# Patient Record
Sex: MALE | Race: WHITE | Employment: FULL TIME | URBAN - METROPOLITAN AREA
[De-identification: names, ages, dates, MRNs, and addresses within clinical notes are randomized per-mention and may not be internally consistent; named-entity substitution may affect disease eponyms.]

---

## 2018-12-14 ENCOUNTER — HOSPITAL ENCOUNTER (OUTPATIENT)
Age: 48
Discharge: HOME | End: 2018-12-16

## 2018-12-14 PROCEDURE — 87081 CULTURE SCREEN ONLY: CPT

## 2018-12-14 PROCEDURE — 88305 TISSUE EXAM BY PATHOLOGIST: CPT

## 2018-12-15 LAB — CLOTEST: NORMAL

## 2021-03-09 ENCOUNTER — IMMUNIZATION (OUTPATIENT)
Dept: PRIMARY CARE CLINIC | Age: 51
End: 2021-03-09
Payer: COMMERCIAL

## 2021-03-09 PROCEDURE — 91303 COVID-19, J&J VACCINE, PF, 0.5 ML DOSE: CPT | Performed by: NURSE PRACTITIONER

## 2021-03-09 PROCEDURE — 0031A COVID-19, J&J VACCINE, PF, 0.5 ML DOSE: CPT | Performed by: NURSE PRACTITIONER

## 2022-04-07 ENCOUNTER — HOSPITAL ENCOUNTER (OUTPATIENT)
Age: 52
Discharge: HOME | End: 2022-04-09
Payer: COMMERCIAL

## 2022-04-07 ENCOUNTER — HOSPITAL ENCOUNTER (OUTPATIENT)
Dept: GENERAL RADIOLOGY | Age: 52
Discharge: HOME | End: 2022-04-09
Payer: COMMERCIAL

## 2022-04-07 DIAGNOSIS — R10.30 LOWER ABDOMINAL PAIN: ICD-10-CM

## 2022-04-07 PROCEDURE — 74018 RADEX ABDOMEN 1 VIEW: CPT

## 2024-02-01 ENCOUNTER — HOSPITAL ENCOUNTER (OUTPATIENT)
Age: 54
Discharge: HOME | End: 2024-02-03

## 2024-02-05 LAB — SURGICAL PATHOLOGY REPORT: NORMAL

## 2024-04-12 ENCOUNTER — HOSPITAL ENCOUNTER (OUTPATIENT)
Dept: NUCLEAR MEDICINE | Age: 54
Discharge: HOME | End: 2024-04-12
Attending: SURGERY
Payer: COMMERCIAL

## 2024-04-12 VITALS — BODY MASS INDEX: 27.98 KG/M2 | WEIGHT: 195 LBS

## 2024-04-12 DIAGNOSIS — R11.0 NAUSEA: ICD-10-CM

## 2024-04-12 DIAGNOSIS — R10.9 ABDOMINAL PAIN, UNSPECIFIED ABDOMINAL LOCATION: ICD-10-CM

## 2024-04-12 PROCEDURE — 6360000002 HC RX W HCPCS: Performed by: RADIOLOGY

## 2024-04-12 PROCEDURE — 2580000003 HC RX 258: Performed by: RADIOLOGY

## 2024-04-12 PROCEDURE — A9537 TC99M MEBROFENIN: HCPCS | Performed by: RADIOLOGY

## 2024-04-12 PROCEDURE — 78227 HEPATOBIL SYST IMAGE W/DRUG: CPT

## 2024-04-12 PROCEDURE — 3430000000 HC RX DIAGNOSTIC RADIOPHARMACEUTICAL: Performed by: RADIOLOGY

## 2024-04-12 RX ADMIN — SINCALIDE 1.77 MCG: 5 INJECTION, POWDER, LYOPHILIZED, FOR SOLUTION INTRAVENOUS at 08:47

## 2024-04-12 RX ADMIN — Medication 6 MILLICURIE: at 07:40

## 2024-04-15 ENCOUNTER — HOSPITAL ENCOUNTER (OUTPATIENT)
Age: 54
Discharge: HOME | End: 2024-04-15

## 2024-04-17 ENCOUNTER — HOSPITAL ENCOUNTER (OUTPATIENT)
Age: 54
Discharge: HOME | End: 2024-04-17
Payer: COMMERCIAL

## 2024-04-17 LAB
ALBUMIN SERPL-MCNC: 4.8 G/DL (ref 3.5–5.2)
ALP SERPL-CCNC: 30 U/L (ref 40–129)
ALT SERPL-CCNC: 44 U/L (ref 0–40)
AMYLASE SERPL-CCNC: 56 U/L (ref 20–100)
ANION GAP SERPL CALCULATED.3IONS-SCNC: 13 MMOL/L (ref 7–16)
AST SERPL-CCNC: 31 U/L (ref 0–39)
BASOPHILS # BLD: 0.05 K/UL (ref 0–0.2)
BASOPHILS NFR BLD: 1 % (ref 0–2)
BILIRUB SERPL-MCNC: 0.3 MG/DL (ref 0–1.2)
BUN SERPL-MCNC: 31 MG/DL (ref 6–20)
C DIFF GDH + TOXINS A+B STL QL IA.RAPID: NEGATIVE
CALCIUM SERPL-MCNC: 10 MG/DL (ref 8.6–10.2)
CHLORIDE SERPL-SCNC: 100 MMOL/L (ref 98–107)
CHOLEST SERPL-MCNC: 159 MG/DL
CO2 SERPL-SCNC: 23 MMOL/L (ref 22–29)
CREAT SERPL-MCNC: 1.3 MG/DL (ref 0.7–1.2)
EOSINOPHIL # BLD: 0.15 K/UL (ref 0.05–0.5)
EOSINOPHILS RELATIVE PERCENT: 2 % (ref 0–6)
ERYTHROCYTE [DISTWIDTH] IN BLOOD BY AUTOMATED COUNT: 12.1 % (ref 11.5–15)
GFR SERPL CREATININE-BSD FRML MDRD: 68 ML/MIN/1.73M2
GLUCOSE SERPL-MCNC: 84 MG/DL (ref 74–99)
HBA1C MFR BLD: 7.6 % (ref 4–5.6)
HCT VFR BLD AUTO: 40.9 % (ref 37–54)
HDLC SERPL-MCNC: 30 MG/DL
HGB BLD-MCNC: 13.8 G/DL (ref 12.5–16.5)
IMM GRANULOCYTES # BLD AUTO: <0.03 K/UL (ref 0–0.58)
IMM GRANULOCYTES NFR BLD: 0 % (ref 0–5)
LDLC SERPL CALC-MCNC: 76 MG/DL
LIPASE SERPL-CCNC: 43 U/L (ref 13–60)
LYMPHOCYTES NFR BLD: 2.26 K/UL (ref 1.5–4)
LYMPHOCYTES RELATIVE PERCENT: 29 % (ref 20–42)
MCH RBC QN AUTO: 29.4 PG (ref 26–35)
MCHC RBC AUTO-ENTMCNC: 33.7 G/DL (ref 32–34.5)
MCV RBC AUTO: 87.2 FL (ref 80–99.9)
MONOCYTES NFR BLD: 0.43 K/UL (ref 0.1–0.95)
MONOCYTES NFR BLD: 5 % (ref 2–12)
NEUTROPHILS NFR BLD: 63 % (ref 43–80)
NEUTS SEG NFR BLD: 5.01 K/UL (ref 1.8–7.3)
PLATELET # BLD AUTO: 241 K/UL (ref 130–450)
PMV BLD AUTO: 9.3 FL (ref 7–12)
POTASSIUM SERPL-SCNC: 4.4 MMOL/L (ref 3.5–5)
PROT SERPL-MCNC: 7.4 G/DL (ref 6.4–8.3)
RBC # BLD AUTO: 4.69 M/UL (ref 3.8–5.8)
SODIUM SERPL-SCNC: 136 MMOL/L (ref 132–146)
SPECIMEN DESCRIPTION: NORMAL
TRIGL SERPL-MCNC: 266 MG/DL
VLDLC SERPL CALC-MCNC: 53 MG/DL
WBC OTHER # BLD: 7.9 K/UL (ref 4.5–11.5)

## 2024-04-17 PROCEDURE — 85025 COMPLETE CBC W/AUTO DIFF WBC: CPT

## 2024-04-17 PROCEDURE — 80061 LIPID PANEL: CPT

## 2024-04-17 PROCEDURE — 87324 CLOSTRIDIUM AG IA: CPT

## 2024-04-17 PROCEDURE — 87427 SHIGA-LIKE TOXIN AG IA: CPT

## 2024-04-17 PROCEDURE — 80053 COMPREHEN METABOLIC PANEL: CPT

## 2024-04-17 PROCEDURE — 87449 NOS EACH ORGANISM AG IA: CPT

## 2024-04-17 PROCEDURE — 87045 FECES CULTURE AEROBIC BACT: CPT

## 2024-04-17 PROCEDURE — 82150 ASSAY OF AMYLASE: CPT

## 2024-04-17 PROCEDURE — 83036 HEMOGLOBIN GLYCOSYLATED A1C: CPT

## 2024-04-17 PROCEDURE — 87046 STOOL CULTR AEROBIC BACT EA: CPT

## 2024-04-17 PROCEDURE — 36415 COLL VENOUS BLD VENIPUNCTURE: CPT

## 2024-04-17 PROCEDURE — 83690 ASSAY OF LIPASE: CPT

## 2024-04-19 LAB
MICROORGANISM SPEC CULT: NORMAL
MICROORGANISM SPEC CULT: NORMAL
SPECIMEN DESCRIPTION: NORMAL

## 2024-05-21 ENCOUNTER — OFFICE VISIT (OUTPATIENT)
Dept: SURGERY | Facility: CLINIC | Age: 54
End: 2024-05-21
Payer: COMMERCIAL

## 2024-05-21 VITALS
DIASTOLIC BLOOD PRESSURE: 73 MMHG | SYSTOLIC BLOOD PRESSURE: 116 MMHG | OXYGEN SATURATION: 98 % | WEIGHT: 190.4 LBS | HEART RATE: 87 BPM

## 2024-05-21 DIAGNOSIS — K82.8 BILIARY DYSKINESIA: Primary | ICD-10-CM

## 2024-05-21 PROCEDURE — 99203 OFFICE O/P NEW LOW 30 MIN: CPT | Performed by: SURGERY

## 2024-05-21 RX ORDER — ENOXAPARIN SODIUM 300 MG/3ML
30 INJECTION INTRAVENOUS; SUBCUTANEOUS ONCE
OUTPATIENT
Start: 2024-05-21 | End: 2024-05-21

## 2024-05-21 RX ORDER — INDOCYANINE GREEN AND WATER 25 MG
2.5 KIT INJECTION ONCE
OUTPATIENT
Start: 2024-05-21 | End: 2024-05-21

## 2024-05-21 RX ORDER — CEFAZOLIN SODIUM 2 G/100ML
2 INJECTION, SOLUTION INTRAVENOUS ONCE
OUTPATIENT
Start: 2024-05-21 | End: 2024-05-21

## 2024-05-21 NOTE — PROGRESS NOTES
General Surgery History and Physical    Referring Provider:  MD Dimas Leigh, Celestine Sauceda MD     Chief Complaint:  Chief Complaint   Patient presents with    New Patient Visit     Eval gallbladder       History of Present Illness:  Adrian Vides is a 53 y.o. male who presents with chronic RUQ pain.   Has had RUQ pain on and off for several months.   With associated nausea   Pain worse when eating, especially grease food per patient   He also has diarrhea which is helped with imodium. Per patient, his doctor adjusted his medications including DM meds which seemed to help to some degree.   However, his pain persists.   Had HIDA scan on 4/12 which showed GB EF at 19%, concerning for biliary dyskinesia.   Recently had US which was concerning for fatty liver, but otherwise not remarkable.   He came today to discuss treatment of biliary dyskinesia.     Had colonoscopy about one year ago: no remarkable per patient.     Past Medical History:  No past medical history on file.     Past Surgical History:  No past surgical history on file.     Medications:  No current outpatient medications     Allergies:  Not on File     Family History:  No family history on file.     Social History:  Social History     Socioeconomic History    Marital status:      Spouse name: Not on file    Number of children: Not on file    Years of education: Not on file    Highest education level: Not on file   Occupational History    Not on file   Tobacco Use    Smoking status: Not on file    Smokeless tobacco: Not on file   Substance and Sexual Activity    Alcohol use: Not on file    Drug use: Not on file    Sexual activity: Not on file   Other Topics Concern    Not on file   Social History Narrative    Not on file     Social Determinants of Health     Financial Resource Strain: Not on file   Food Insecurity: Not on file   Transportation Needs: Not on file   Physical Activity: Not on file   Stress: Not on file   Social  "Connections: Not on file   Intimate Partner Violence: Not on file   Housing Stability: Not on file        Review of Systems:  A complete 12 point review of systems was performed. Please see scanned questionnaire and is otherwise negative except as noted in the history of present illness.    Vital Signs:  Vitals:    05/21/24 0923   BP: 116/73   Pulse: 87   SpO2: 98%      There is no height or weight on file to calculate BMI.      Physical Exam:  General: No acute distress.   Neuro: Alert and oriented ×3. Follows commands.  Face: Atraumatic, no visible skin lesion.   Head: Atraumatic  Eyes: Pupils equal reactive to light. Extraocular motions intact.  Ears: Hears normal speaking voice.  Mouth, Nose, Throat: Mucous membranes moist.  Normal dentition.  Neck: Supple. No visible masses.  Breast: Not examined.   Lung: Patent airway and no labored breath.   Vascular: Palpable radial pulses bilaterally.  Abdomen: Soft, NT,ND.   Rectal and Perianal: Not examined.   Genitourinary: Not examined.   Musculoskeletal: Moves all extremities.  Normal range of motion.  Extremities: No cyanosis. No edema.   Lymphatic: No palpable lymph nodes.  Skin: No rashes or lesions.  Psychological: Normal affect      Laboratory Values:  CBC: No results found for: \"WBC\", \"RBC\", \"HGB\", \"HCT\", \"PLT\"    RFP: No results found for: \"GLUF\", \"NA\", \"K\", \"CL\", \"CO2\", \"BUN\", \"CREATININE\", \"CALCIUM\", \"MG\", \"PHOS\"     LFTs: No results found for: \"PROT\", \"ALBUMIN\", \"BILITOT\", \"BILIDIR\", \"ALKPHOS\", \"AST\", \"ALT\"     Labs from 4/17 was reviewed: OK.       Imaging:  I have personally reviewed the images and the radiologist's report.  HIDA and US report was reviewed as well.   My office will upload US disc to our system       Assessment:  This is a 53 y.o. male who presents with   Chronic RUQ pain: consistent with biliary colic   Concerning for biliary dyskinesia       Plan:  I discussed with the patient and explained to the patient that his symptoms are most likely " caused by biliary dyskinesia. I would recommend gallbladder removal. I explained to the patient about the risks and benefits of gallbladder surgery (laparoscopic cholecystectomy or robot assisted laparoscopic cholecystectomy if robot is available on surgery day). Patient expressed understanding that the risks of surgery include but are not limited to bleeding, infection, possible intra-abdominal viscus injury and bile duct injury which may require more extensive surgery, possibility for open surgery, risks of anesthesia, mortality in rare/extreme situations. Patient agreed to proceed. He also understood that he may continue to have pain after surgery. However, I explained to him that most likely surgery will help him.     Due to his comorbidities, he will need PAT before surgery.       Fatmata Block MD [facility]  General Surgery  Office: [phone]  Fax:     [phone]  1:09 PM   05/21/24

## 2024-06-12 ENCOUNTER — PRE-ADMISSION TESTING (OUTPATIENT)
Dept: PREADMISSION TESTING | Facility: HOSPITAL | Age: 54
End: 2024-06-12
Payer: COMMERCIAL

## 2024-06-12 VITALS
BODY MASS INDEX: 28.03 KG/M2 | DIASTOLIC BLOOD PRESSURE: 87 MMHG | HEART RATE: 84 BPM | TEMPERATURE: 98.2 F | HEIGHT: 70 IN | OXYGEN SATURATION: 99 % | WEIGHT: 195.77 LBS | SYSTOLIC BLOOD PRESSURE: 157 MMHG | RESPIRATION RATE: 18 BRPM

## 2024-06-12 DIAGNOSIS — Z01.818 PREOPERATIVE EXAMINATION: Primary | ICD-10-CM

## 2024-06-12 LAB
ALBUMIN SERPL BCP-MCNC: 4.8 G/DL (ref 3.4–5)
ALP SERPL-CCNC: 27 U/L (ref 33–120)
ALT SERPL W P-5'-P-CCNC: 45 U/L (ref 10–52)
ANION GAP SERPL CALC-SCNC: 13 MMOL/L (ref 10–20)
AST SERPL W P-5'-P-CCNC: 29 U/L (ref 9–39)
ATRIAL RATE: 83 BPM
BASOPHILS # BLD AUTO: 0.06 X10*3/UL (ref 0–0.1)
BASOPHILS NFR BLD AUTO: 1 %
BILIRUB SERPL-MCNC: 0.4 MG/DL (ref 0–1.2)
BUN SERPL-MCNC: 20 MG/DL (ref 6–23)
CALCIUM SERPL-MCNC: 10 MG/DL (ref 8.6–10.3)
CHLORIDE SERPL-SCNC: 102 MMOL/L (ref 98–107)
CO2 SERPL-SCNC: 26 MMOL/L (ref 21–32)
CREAT SERPL-MCNC: 0.97 MG/DL (ref 0.5–1.3)
EGFRCR SERPLBLD CKD-EPI 2021: >90 ML/MIN/1.73M*2
EOSINOPHIL # BLD AUTO: 0.15 X10*3/UL (ref 0–0.7)
EOSINOPHIL NFR BLD AUTO: 2.5 %
ERYTHROCYTE [DISTWIDTH] IN BLOOD BY AUTOMATED COUNT: 12.4 % (ref 11.5–14.5)
GLUCOSE SERPL-MCNC: 144 MG/DL (ref 74–99)
HCT VFR BLD AUTO: 38 % (ref 41–52)
HGB BLD-MCNC: 12.9 G/DL (ref 13.5–17.5)
IMM GRANULOCYTES # BLD AUTO: 0.01 X10*3/UL (ref 0–0.7)
IMM GRANULOCYTES NFR BLD AUTO: 0.2 % (ref 0–0.9)
LYMPHOCYTES # BLD AUTO: 1.83 X10*3/UL (ref 1.2–4.8)
LYMPHOCYTES NFR BLD AUTO: 30.2 %
MCH RBC QN AUTO: 29.1 PG (ref 26–34)
MCHC RBC AUTO-ENTMCNC: 33.9 G/DL (ref 32–36)
MCV RBC AUTO: 86 FL (ref 80–100)
MONOCYTES # BLD AUTO: 0.45 X10*3/UL (ref 0.1–1)
MONOCYTES NFR BLD AUTO: 7.4 %
NEUTROPHILS # BLD AUTO: 3.55 X10*3/UL (ref 1.2–7.7)
NEUTROPHILS NFR BLD AUTO: 58.7 %
NRBC BLD-RTO: 0 /100 WBCS (ref 0–0)
P OFFSET: 177 MS
P ONSET: 139 MS
PLATELET # BLD AUTO: 222 X10*3/UL (ref 150–450)
POTASSIUM SERPL-SCNC: 4.4 MMOL/L (ref 3.5–5.3)
PR INTERVAL: 170 MS
PROT SERPL-MCNC: 6.7 G/DL (ref 6.4–8.2)
Q ONSET: 224 MS
QRS COUNT: 13 BEATS
QRS DURATION: 88 MS
QT INTERVAL: 346 MS
QTC CALCULATION(BAZETT): 406 MS
QTC FREDERICIA: 385 MS
R AXIS: -9 DEGREES
RBC # BLD AUTO: 4.44 X10*6/UL (ref 4.5–5.9)
SODIUM SERPL-SCNC: 137 MMOL/L (ref 136–145)
T AXIS: 41 DEGREES
T OFFSET: 397 MS
VENTRICULAR RATE: 83 BPM
WBC # BLD AUTO: 6.1 X10*3/UL (ref 4.4–11.3)

## 2024-06-12 PROCEDURE — 84075 ASSAY ALKALINE PHOSPHATASE: CPT

## 2024-06-12 PROCEDURE — 85025 COMPLETE CBC W/AUTO DIFF WBC: CPT

## 2024-06-12 PROCEDURE — 93005 ELECTROCARDIOGRAM TRACING: CPT

## 2024-06-12 PROCEDURE — 36415 COLL VENOUS BLD VENIPUNCTURE: CPT

## 2024-06-12 PROCEDURE — 99203 OFFICE O/P NEW LOW 30 MIN: CPT | Performed by: NURSE PRACTITIONER

## 2024-06-12 RX ORDER — ATORVASTATIN CALCIUM 40 MG/1
40 TABLET, FILM COATED ORAL EVERY EVENING
COMMUNITY

## 2024-06-12 RX ORDER — SEMAGLUTIDE 2.68 MG/ML
2 INJECTION, SOLUTION SUBCUTANEOUS
COMMUNITY

## 2024-06-12 RX ORDER — ICOSAPENT ETHYL 1 G/1
2 CAPSULE ORAL
COMMUNITY

## 2024-06-12 RX ORDER — AMITRIPTYLINE HYDROCHLORIDE 25 MG/1
25 TABLET, FILM COATED ORAL NIGHTLY
COMMUNITY

## 2024-06-12 RX ORDER — FENOFIBRATE 145 MG/1
145 TABLET, FILM COATED ORAL EVERY EVENING
COMMUNITY

## 2024-06-12 RX ORDER — LISINOPRIL 5 MG/1
5 TABLET ORAL NIGHTLY
COMMUNITY

## 2024-06-12 RX ORDER — OMEPRAZOLE 20 MG/1
20 CAPSULE, DELAYED RELEASE ORAL
COMMUNITY

## 2024-06-12 RX ORDER — METFORMIN HYDROCHLORIDE 500 MG/1
500 TABLET ORAL
COMMUNITY

## 2024-06-12 RX ORDER — CHOLESTYRAMINE 4 G/9G
POWDER, FOR SUSPENSION ORAL
COMMUNITY

## 2024-06-12 ASSESSMENT — DUKE ACTIVITY SCORE INDEX (DASI)
CAN YOU WALK A BLOCK OR TWO ON LEVEL GROUND: YES
CAN YOU DO YARD WORK LIKE RAKING LEAVES, WEEDING OR PUSHING A MOWER: YES
CAN YOU CLIMB A FLIGHT OF STAIRS OR WALK UP A HILL: YES
CAN YOU PARTICIPATE IN MODERATE RECREATIONAL ACTIVITIES LIKE GOLF, BOWLING, DANCING, DOUBLES TENNIS OR THROWING A BASEBALL OR FOOTBALL: YES
CAN YOU TAKE CARE OF YOURSELF (EAT, DRESS, BATHE, OR USE TOILET): YES
CAN YOU HAVE SEXUAL RELATIONS: YES
CAN YOU PARTICIPATE IN STRENOUS SPORTS LIKE SWIMMING, SINGLES TENNIS, FOOTBALL, BASKETBALL, OR SKIING: YES
DASI METS SCORE: 9.9
TOTAL_SCORE: 58.2
CAN YOU DO LIGHT WORK AROUND THE HOUSE LIKE DUSTING OR WASHING DISHES: YES
CAN YOU WALK INDOORS, SUCH AS AROUND YOUR HOUSE: YES
CAN YOU RUN A SHORT DISTANCE: YES
CAN YOU DO HEAVY WORK AROUND THE HOUSE LIKE SCRUBBING FLOORS OR LIFTING AND MOVING HEAVY FURNITURE: YES
CAN YOU DO MODERATE WORK AROUND THE HOUSE LIKE VACUUMING, SWEEPING FLOORS OR CARRYING GROCERIES: YES

## 2024-06-12 ASSESSMENT — PAIN - FUNCTIONAL ASSESSMENT: PAIN_FUNCTIONAL_ASSESSMENT: 0-10

## 2024-06-12 ASSESSMENT — ENCOUNTER SYMPTOMS
DIARRHEA: 1
ROS GI COMMENTS: STOMACH PAIN AFTER EATING
NEUROLOGICAL NEGATIVE: 1
NECK NEGATIVE: 1
CONSTITUTIONAL NEGATIVE: 1
RESPIRATORY NEGATIVE: 1
CARDIOVASCULAR NEGATIVE: 1
MUSCULOSKELETAL NEGATIVE: 1

## 2024-06-12 ASSESSMENT — LIFESTYLE VARIABLES: SMOKING_STATUS: NONSMOKER

## 2024-06-12 ASSESSMENT — PAIN SCALES - GENERAL: PAINLEVEL_OUTOF10: 0 - NO PAIN

## 2024-06-12 NOTE — H&P (VIEW-ONLY)
CPM/PAT Evaluation       Name: Adrian Vides (Adrian Vides)  /Age: [birth year]//53 y.o.     Visit Type:   In-Person       Chief Complaint: chronic RUQ pan     HPI 54 y/o male scheduled for laparoscopic cholecystectomy  on 2024 with  Dr. Block secondary to biliary colic  PMHX includes chronic RUQ pain,  DM2, HLD, prostatitis, urethral stricture.  PAT is consulted today for perioperative risk stratification and optimization.      Past Medical History:   Diagnosis Date    Dyskinesia of gallbladder     Fatty liver     GERD (gastroesophageal reflux disease)     Hyperlipidemia     Hypertension     Type 2 diabetes mellitus (Multi)        Past Surgical History:   Procedure Laterality Date    APPENDECTOMY      URETHRA SURGERY      urethral reconstruction       Patient  has no history on file for sexual activity.    No family history on file.    Allergies   Allergen Reactions    Sulfa (Sulfonamide Antibiotics) Rash    Toradol [Ketorolac] Rash    Tramadol Rash       Prior to Admission medications    Not on File        PAT ROS:   Constitutional:   neg    Neuro/Psych:   neg    Eyes:    use of corrective lenses  Ears:   neg    Nose:   Mouth:   neg    Throat:   neg    Neck:   neg    Cardio:   neg    Respiratory:   neg    Endocrine:   GI:    Stomach pain after eating   diarrhea  :   neg    Musculoskeletal:   neg    Hematologic:   neg    Skin:      Physical Exam  Vitals reviewed.   Constitutional:       Appearance: Normal appearance.   HENT:      Mouth/Throat:      Mouth: Mucous membranes are moist.      Pharynx: Oropharynx is clear.   Eyes:      Pupils: Pupils are equal, round, and reactive to light.   Cardiovascular:      Rate and Rhythm: Normal rate and regular rhythm.      Pulses: Normal pulses.      Heart sounds: Normal heart sounds.   Pulmonary:      Effort: Pulmonary effort is normal.      Breath sounds: Normal breath sounds.   Abdominal:      General: Bowel sounds are normal.      Palpations: Abdomen is soft.    Musculoskeletal:         General: Normal range of motion.      Cervical back: Normal range of motion and neck supple.   Skin:     General: Skin is warm and dry.   Neurological:      General: No focal deficit present.      Mental Status: He is alert and oriented to person, place, and time.   Psychiatric:         Mood and Affect: Mood normal.         Behavior: Behavior normal.          PAT AIRWAY:   Airway:     Mallampati::  IV    Neck ROM::  Full  normal        Visit Vitals  /87   Pulse 84   Temp 36.8 °C (98.2 °F) (Temporal)   Resp 18       DASI Risk Score      Flowsheet Row Most Recent Value   DASI SCORE 58.2   METS Score (Will be calculated only when all the questions are answered) 9.9          Caprini DVT Assessment      Flowsheet Row Most Recent Value   DVT Score 4   Current Status Major surgery planned, including arthroscopic and laproscopic (1-2 hours)   Age 40-59 years   BMI 30 or less          Modified Frailty Index    No data to display       CHADS2 Stroke Risk  Current as of 15 minutes ago        N/A 3 to 100%: High Risk   2 to < 3%: Medium Risk   0 to < 2%: Low Risk     Last Change: N/A          This score determines the patient's risk of having a stroke if the patient has atrial fibrillation.        This score is not applicable to this patient. Components are not calculated.          Revised Cardiac Risk Index      Flowsheet Row Most Recent Value   Revised Cardiac Risk Calculator 1          Apfel Simplified Score      Flowsheet Row Most Recent Value   Apfel Simplified Score Calculator 2          Risk Analysis Index Results This Encounter    No data found in the last 1 encounters.       Stop Bang Score      Flowsheet Row Most Recent Value   Do you snore loudly? 1   Do you often feel tired or fatigued after your sleep? 0   Has anyone ever observed you stop breathing in your sleep? 0   Do you have or are you being treated for high blood pressure? 1   Recent BMI (Calculated) 0   Age older than 50  years old? 1=Yes   Is your neck circumference greater than 17 inches (Male) or 16 inches (Female)? 0   Gender - Male 1=Yes                  Assessment and Plan:     HPI 52 y/o male scheduled for laparoscopic cholecystectomy  on 6/19/2024 with  Dr. Block secondary to biliary colic  PMHX includes chronic RUQ pain,  DM2 (2014), HTN, HLD, Depression, prostatitis, urethral stricture.  PAT is consulted today for perioperative risk stratification and optimization.    Neuro:  No neurologic diagnosis or significant findings on chart review, clinical presentation and evaluation.  No grossly apparent neurologic perioperative risk.  Patient is at increased risk for perioperative CVA secondary to  DM    HEENT:  No HEENT diagnosis or significant findings on chart review or clinical presentation and evaluation. No further preoperative testing/intervention indicated at this time.    Cardiovascular:  HTN - controlled  Will hold Lisinopril 1 day prior to surgery  METS: 9.9  RCRI: 1 point, 6.0% risk for postoperative MACE   SLALY: 0.1% risk for 30 day postoperative MACE  EKG - as above    Pulmonary:  No pulmonary diagnosis, however patient is at increased risk of perioperative complications secondary to  duration of surgery > 2 hours  Stop Bang score is 4 placing patient at high risk for GERALDINE  ARISCAT: <26 points, 1.6% risk of in-hospital postoperative pulmonary complication  PRODIGY: Low risk for opioid induced respiratory depression  Pumonary toilet education discussed, patient also provided deep breathing exercises with educational handout    Renal:   No renal diagnosis, however patient is at increase risk for perioperative renal complications secondary to  HTN, diabetes, use of an ace, arb, or NSAID      Endocrine:  Follows with Dr. Herbert and was last seen 3/26/2024  Will hold Metformin; Cr 1.3  Holding Ozempic for 1 week  Glargine is 1/2 dose  Utilizes Glucose Mgmt Indicator - A1c is 6.7 on 6/12/2024    Hematologic:  No hematologic  diagnosis, however patient is at an increased risk for DVT  Caprini Score 4, patient at High risk for perioperative DVT.  Patient provided with VTE education/handout.    Gastrointestinal:   Follow with Dr. Block and was last seen on 5/21/2024 for RUQ chronic pain  Plann for laparoscopic cholecystectomy on 6/19/2024  Apfel 2    Infectious disease:   No infectious diagnosis or significant findings on chart review or clinical presentation and evaluation.     General Surgery:  Follows with Dr Block and was last seen on 5/21/2024 for biliary dyskinesia  HIDA Scan done 4/12/2024  Plan for laparoscopic cholecystectomy on 6/19/2024    Musculoskeletal:   No diagnosis or significant findings on chart review or clinical presentation and evaluation.     Anesthesia/Airway:  No anesthesia complications      Medication instructions and NPO guidelines reviewed with the patient.  All questions or concerns discussed and addressed.      Labs and EKG ordered   CBC, BMP, and EKG

## 2024-06-12 NOTE — PREPROCEDURE INSTRUCTIONS
Thank you for visiting Preadmission Testing (PAT) today for your pre-procedure evaluation, you were seen by     Debbie Mendosa CNP  Pre Admission Testing  [facility]  [phone]    This summary includes instructions and information to aid you during your perioperative period.  Please read carefully. If you have any questions about your visit today, please call the number listed above.  If you become ill or have any changes to your health before your surgery, please contact your primary care provider and alert your surgeon.    Preparing for your Surgery       Exercises  Preoperative Deep Breathing Exercises  Why it is important to do deep breathing exercises before my surgery?  Deep breathing exercises strengthen your breathing muscles.  This helps you to recover after your surgery and decreases the chance of breathing complications.  How are the deep breathing exercises done?  Sit straight with your back supported.  Breathe in deeply and slowly through your nose. Your lower rib cage should expand and your abdomen may move forward.  Hold that breath for 3 to 5 seconds.  Breathe out through pursed lips, slowly and completely.  Rest and repeat 10 times every hour while awake.  Rest longer if you become dizzy or lightheaded.       Preoperative Brain Exercises    What are brain exercises?  A brain exercise is any activity that engages your thinking (cognitive) skills.    What types of activities are considered brain exercises?  Jigsaw puzzles, crossword puzzles, word jumble, memory games, word search, and many more.  Many can be found free online or on your phone via a mobile harish.    Why should I do brain exercises before my surgery?  More recent research has shown brain exercise before surgery can lower the risk of postoperative delirium (confusion) which can be especially important for older adults.  Patients who did brain exercises for 5 to 10 hours the days before surgery, cut their risk  of postoperative delirium in half up to 1 week after surgery.    Sit-to-Stand Exercise    What is the sit-to-stand exercise?  The sit-to-stand exercise strengthens the muscles of your lower body and muscles in the center of your body (core muscles for stability) helping to maintain and improve your strength and mobility.  How do I do the sit-to-stand exercise?  The goal is to do this exercise without using your arms or hands.  If this is too difficult, use your arms and hands or a chair with armrests to help slowly push yourself to the standing position and lower yourself back to the sitting position. As the movement becomes easier use your arms and hands less.    Steps to the sit-to-stand exercise  Sit up tall in a sturdy chair, knees bent, feet flat on the floor shoulder-width apart.  Shift your hips/pelvis forward in the chair to correctly position yourself for the next movement.  Lean forward at your hips.  Stand up straight putting equal weight on both feet.  Check to be sure you are properly aligned with the chair, in a slow controlled movement sit back down.  Repeat this exercise 10-15 times.  If needed you can do it fewer times until your strength improves.  Rest for 1 minute.  Do another 10-15 sit-to-stand exercises.  Try to do this in the morning and evening.        Instructions    Preoperative Fasting Guidelines    Why must I stop eating and drinking near surgery time?  With sedation, food or liquid in your stomach can enter your lungs causing serious complications  Food can increase nausea and vomiting  When do I need to stop eating and drinking before my surgery?      Do not eat any food or drink any liquids after midnight the night before your surgery/procedure.  You may have sips of water to take medications.            Simple things you can do to help prevent blood clots     Blood clots are blockages that can form in the body's veins. When a blood clot forms in your deep veins, it may be called a  deep vein thrombosis, or DVT for short. Blood clots can happen in any part of the body where blood flows, but they are most common in the arms and legs. If a piece of a blood clot breaks free and travels to the lungs, it is called a pulmonary embolus (PE). A PE can be a very serious problem.         Being in the hospital or having surgery can raise your chances of getting a blood clot because you may not be well enough to move around as much as you normally do.         Ways you can help prevent blood clots in the hospital       Wearing SCDs  SCDs stands for Sequential Compression Devices.   SCDs are special sleeves that wrap around your legs. They attach to a pump that fills them with air to gently squeeze your legs every few minutes.  This helps return the blood in your legs to your heart.   SCDs should only be taken off when walking or bathing. SCDs may not be comfortable, but they can help save your life.              Pump SCD leg sleeves  Wearing compression stockings - if your doctor orders them. These special snug-fitting stockings gently squeeze your legs to help blood flow.       Walking. Walking helps move the blood in your legs.   If your doctor says it is ok, try walking the halls at least   5 times a day. Ask us to help you get up, so you don't fall.      Taking any blood-thinning medicines your doctor orders.              Ways you can help prevent blood clots at home         Wearing compression stockings - if your doctor orders them.   Walking - to help move the blood in your legs.    Taking any blood-thinning medicines your doctor orders.      Signs of a blood clot or PE    Tell your doctor or nurse right away if you have any of the problems listed below.         If you are at home, seek medical care right away. Call 911 for chest pain or problems breathing.            Signs of a blood clot (DVT) - such as pain, swelling, redness, or warmth in your arm or legs.  Signs of a pulmonary embolism (PE) -  such as chest pain or feeling short of breath      Tobacco and Alcohol;  Do not drink alcohol or smoke within 24 hours of surgery.  It is best to quit smoking for as long as possible before any surgery or procedure.      The Week before Surgery        Seven days before Surgery  Check your PAT medication instructions  Do the exercises provided to you by PAT  Arrange for a responsible, adult licensed  to take you home after surgery and stay with you for 24 hours.  You will not be permitted to drive yourself home if you have received any anesthetic/sedation  Six days before surgery  Check your PAT medication instructions  Do the exercises provided to you by PAT  Start using Chlorhexidene (CHG) body wash if prescribed  Five days before surgery  Check your PAT medication instructions  Do the exercises provided to you by PAT  Continue to use CHG body wash if prescribed  Three days before surgery  Check your PAT medication instructions  Do the exercises provided to you by PAT  Continue to use CHG body wash if prescribed  Two days before surgery  Check your PAT medication instructions  Do the exercises provided to you by PAT  Continue to use CHG body wash if prescribed    The Day before Surgery       Check your PAT medication and all other PAT instructions including when to stop eating and drinking  You will be called with your arrival time for surgery in the late afternoon.  If you do not receive a call please reach out to your surgeon's office.  Do not smoke or drink 24 hours before surgery  Prepare items to bring with you to the hospital  Shower with your chlorhexidine wash if prescribed  Brush your teeth and use your chlorhexidine dental rinse if prescribed    The Day of Surgery       Check your PAT medication instructions  Ensure you follow the instructions for when to stop eating and drinking  Shower, if prescribed use CHG.  Do not apply any lotions, creams, moisturizers, perfume or deodorant  Brush your teeth  and use your CHG dental rinse if prescribed  Wear loose comfortable clothing  Avoid make-up  Remove  jewelry and piercings, consider professional piercing removal with a plastic spacer if needed  Bring photo ID and Insurance card  Bring an accurate medication list that includes medication dose, frequency and allergies  Bring a copy of your advanced directives (will, health care power of )  Bring any devices and controllers as well as medical devices you have been provided with for surgery (CPAP, slings, braces, etc.)  Dentures, eyeglasses, and contacts will be removed before surgery, please bring cases for contacts or glasses

## 2024-06-12 NOTE — CPM/PAT H&P
CPM/PAT Evaluation       Name: Adrian Vides (Adrian Vides)  /Age: 10[birth year]//53 y.o.     Visit Type:   In-Person       Chief Complaint: chronic RUQ pan     HPI 54 y/o male scheduled for laparoscopic cholecystectomy  on 2024 with  Dr. Block secondary to biliary colic  PMHX includes chronic RUQ pain,  DM2, HLD, prostatitis, urethral stricture.  PAT is consulted today for perioperative risk stratification and optimization.      Past Medical History:   Diagnosis Date    Dyskinesia of gallbladder     Fatty liver     GERD (gastroesophageal reflux disease)     Hyperlipidemia     Hypertension     Type 2 diabetes mellitus (Multi)        Past Surgical History:   Procedure Laterality Date    APPENDECTOMY      URETHRA SURGERY      urethral reconstruction       Patient  has no history on file for sexual activity.    No family history on file.    Allergies   Allergen Reactions    Sulfa (Sulfonamide Antibiotics) Rash    Toradol [Ketorolac] Rash    Tramadol Rash       Prior to Admission medications    Not on File        PAT ROS:   Constitutional:   neg    Neuro/Psych:   neg    Eyes:    use of corrective lenses  Ears:   neg    Nose:   Mouth:   neg    Throat:   neg    Neck:   neg    Cardio:   neg    Respiratory:   neg    Endocrine:   GI:    Stomach pain after eating   diarrhea  :   neg    Musculoskeletal:   neg    Hematologic:   neg    Skin:      Physical Exam  Vitals reviewed.   Constitutional:       Appearance: Normal appearance.   HENT:      Mouth/Throat:      Mouth: Mucous membranes are moist.      Pharynx: Oropharynx is clear.   Eyes:      Pupils: Pupils are equal, round, and reactive to light.   Cardiovascular:      Rate and Rhythm: Normal rate and regular rhythm.      Pulses: Normal pulses.      Heart sounds: Normal heart sounds.   Pulmonary:      Effort: Pulmonary effort is normal.      Breath sounds: Normal breath sounds.   Abdominal:      General: Bowel sounds are normal.      Palpations: Abdomen is soft.    Musculoskeletal:         General: Normal range of motion.      Cervical back: Normal range of motion and neck supple.   Skin:     General: Skin is warm and dry.   Neurological:      General: No focal deficit present.      Mental Status: He is alert and oriented to person, place, and time.   Psychiatric:         Mood and Affect: Mood normal.         Behavior: Behavior normal.          PAT AIRWAY:   Airway:     Mallampati::  IV    Neck ROM::  Full  normal        Visit Vitals  /87   Pulse 84   Temp 36.8 °C (98.2 °F) (Temporal)   Resp 18       DASI Risk Score      Flowsheet Row Most Recent Value   DASI SCORE 58.2   METS Score (Will be calculated only when all the questions are answered) 9.9          Caprini DVT Assessment      Flowsheet Row Most Recent Value   DVT Score 4   Current Status Major surgery planned, including arthroscopic and laproscopic (1-2 hours)   Age 40-59 years   BMI 30 or less          Modified Frailty Index    No data to display       CHADS2 Stroke Risk  Current as of 15 minutes ago        N/A 3 to 100%: High Risk   2 to < 3%: Medium Risk   0 to < 2%: Low Risk     Last Change: N/A          This score determines the patient's risk of having a stroke if the patient has atrial fibrillation.        This score is not applicable to this patient. Components are not calculated.          Revised Cardiac Risk Index      Flowsheet Row Most Recent Value   Revised Cardiac Risk Calculator 1          Apfel Simplified Score      Flowsheet Row Most Recent Value   Apfel Simplified Score Calculator 2          Risk Analysis Index Results This Encounter    No data found in the last 1 encounters.       Stop Bang Score      Flowsheet Row Most Recent Value   Do you snore loudly? 1   Do you often feel tired or fatigued after your sleep? 0   Has anyone ever observed you stop breathing in your sleep? 0   Do you have or are you being treated for high blood pressure? 1   Recent BMI (Calculated) 0   Age older than 50  years old? 1=Yes   Is your neck circumference greater than 17 inches (Male) or 16 inches (Female)? 0   Gender - Male 1=Yes                  Assessment and Plan:     HPI 54 y/o male scheduled for laparoscopic cholecystectomy  on 6/19/2024 with  Dr. Block secondary to biliary colic  PMHX includes chronic RUQ pain,  DM2 (2014), HTN, HLD, Depression, prostatitis, urethral stricture.  PAT is consulted today for perioperative risk stratification and optimization.    Neuro:  No neurologic diagnosis or significant findings on chart review, clinical presentation and evaluation.  No grossly apparent neurologic perioperative risk.  Patient is at increased risk for perioperative CVA secondary to  DM    HEENT:  No HEENT diagnosis or significant findings on chart review or clinical presentation and evaluation. No further preoperative testing/intervention indicated at this time.    Cardiovascular:  HTN - controlled  Will hold Lisinopril 1 day prior to surgery  METS: 9.9  RCRI: 1 point, 6.0% risk for postoperative MACE   SALLY: 0.1% risk for 30 day postoperative MACE  EKG - as above    Pulmonary:  No pulmonary diagnosis, however patient is at increased risk of perioperative complications secondary to  duration of surgery > 2 hours  Stop Bang score is 4 placing patient at high risk for GERALDINE  ARISCAT: <26 points, 1.6% risk of in-hospital postoperative pulmonary complication  PRODIGY: Low risk for opioid induced respiratory depression  Pumonary toilet education discussed, patient also provided deep breathing exercises with educational handout    Renal:   No renal diagnosis, however patient is at increase risk for perioperative renal complications secondary to  HTN, diabetes, use of an ace, arb, or NSAID      Endocrine:  Follows with Dr. Herbert and was last seen 3/26/2024  Will hold Metformin; Cr 1.3  Holding Ozempic for 1 week  Glargine is 1/2 dose  Utilizes Glucose Mgmt Indicator - A1c is 6.7 on 6/12/2024    Hematologic:  No hematologic  diagnosis, however patient is at an increased risk for DVT  Caprini Score 4, patient at High risk for perioperative DVT.  Patient provided with VTE education/handout.    Gastrointestinal:   Follow with Dr. Block and was last seen on 5/21/2024 for RUQ chronic pain  Plann for laparoscopic cholecystectomy on 6/19/2024  Apfel 2    Infectious disease:   No infectious diagnosis or significant findings on chart review or clinical presentation and evaluation.     General Surgery:  Follows with Dr Block and was last seen on 5/21/2024 for biliary dyskinesia  HIDA Scan done 4/12/2024  Plan for laparoscopic cholecystectomy on 6/19/2024    Musculoskeletal:   No diagnosis or significant findings on chart review or clinical presentation and evaluation.     Anesthesia/Airway:  No anesthesia complications      Medication instructions and NPO guidelines reviewed with the patient.  All questions or concerns discussed and addressed.      Labs and EKG ordered   CBC, BMP, and EKG

## 2024-06-18 ENCOUNTER — ANESTHESIA EVENT (OUTPATIENT)
Dept: OPERATING ROOM | Facility: HOSPITAL | Age: 54
End: 2024-06-18
Payer: COMMERCIAL

## 2024-06-19 ENCOUNTER — ANESTHESIA (OUTPATIENT)
Dept: OPERATING ROOM | Facility: HOSPITAL | Age: 54
End: 2024-06-19
Payer: COMMERCIAL

## 2024-06-19 ENCOUNTER — PHARMACY VISIT (OUTPATIENT)
Dept: PHARMACY | Facility: CLINIC | Age: 54
End: 2024-06-19
Payer: COMMERCIAL

## 2024-06-19 ENCOUNTER — HOSPITAL ENCOUNTER (OUTPATIENT)
Facility: HOSPITAL | Age: 54
Setting detail: OUTPATIENT SURGERY
Discharge: HOME | End: 2024-06-19
Attending: SURGERY | Admitting: SURGERY
Payer: COMMERCIAL

## 2024-06-19 VITALS
SYSTOLIC BLOOD PRESSURE: 138 MMHG | DIASTOLIC BLOOD PRESSURE: 69 MMHG | OXYGEN SATURATION: 94 % | HEIGHT: 70 IN | TEMPERATURE: 98.1 F | HEART RATE: 81 BPM | WEIGHT: 192.02 LBS | RESPIRATION RATE: 16 BRPM | BODY MASS INDEX: 27.49 KG/M2

## 2024-06-19 DIAGNOSIS — Z90.49 S/P LAPAROSCOPIC CHOLECYSTECTOMY: Primary | ICD-10-CM

## 2024-06-19 DIAGNOSIS — K82.8 BILIARY DYSKINESIA: ICD-10-CM

## 2024-06-19 LAB
GLUCOSE BLD MANUAL STRIP-MCNC: 164 MG/DL (ref 74–99)
GLUCOSE BLD MANUAL STRIP-MCNC: 206 MG/DL (ref 74–99)

## 2024-06-19 PROCEDURE — 47100 WEDGE BIOPSY OF LIVER: CPT | Performed by: SURGERY

## 2024-06-19 PROCEDURE — 2500000004 HC RX 250 GENERAL PHARMACY W/ HCPCS (ALT 636 FOR OP/ED): Performed by: NURSE ANESTHETIST, CERTIFIED REGISTERED

## 2024-06-19 PROCEDURE — 2500000004 HC RX 250 GENERAL PHARMACY W/ HCPCS (ALT 636 FOR OP/ED): Performed by: ANESTHESIOLOGY

## 2024-06-19 PROCEDURE — 7100000010 HC PHASE TWO TIME - EACH INCREMENTAL 1 MINUTE: Performed by: SURGERY

## 2024-06-19 PROCEDURE — 2500000005 HC RX 250 GENERAL PHARMACY W/O HCPCS: Performed by: NURSE ANESTHETIST, CERTIFIED REGISTERED

## 2024-06-19 PROCEDURE — 7100000009 HC PHASE TWO TIME - INITIAL BASE CHARGE: Performed by: SURGERY

## 2024-06-19 PROCEDURE — 2500000004 HC RX 250 GENERAL PHARMACY W/ HCPCS (ALT 636 FOR OP/ED): Performed by: SURGERY

## 2024-06-19 PROCEDURE — 2780000003 HC OR 278 NO HCPCS: Performed by: SURGERY

## 2024-06-19 PROCEDURE — 47562 LAPAROSCOPIC CHOLECYSTECTOMY: CPT | Performed by: SURGERY

## 2024-06-19 PROCEDURE — RXMED WILLOW AMBULATORY MEDICATION CHARGE

## 2024-06-19 PROCEDURE — 82947 ASSAY GLUCOSE BLOOD QUANT: CPT

## 2024-06-19 PROCEDURE — P9045 ALBUMIN (HUMAN), 5%, 250 ML: HCPCS | Mod: JZ | Performed by: NURSE ANESTHETIST, CERTIFIED REGISTERED

## 2024-06-19 PROCEDURE — 96372 THER/PROPH/DIAG INJ SC/IM: CPT | Performed by: SURGERY

## 2024-06-19 PROCEDURE — A47562 PR LAP,CHOLECYSTECTOMY: Performed by: NURSE ANESTHETIST, CERTIFIED REGISTERED

## 2024-06-19 PROCEDURE — 3700000001 HC GENERAL ANESTHESIA TIME - INITIAL BASE CHARGE: Performed by: SURGERY

## 2024-06-19 PROCEDURE — 88307 TISSUE EXAM BY PATHOLOGIST: CPT | Mod: TC,GEALAB | Performed by: SURGERY

## 2024-06-19 PROCEDURE — 3700000002 HC GENERAL ANESTHESIA TIME - EACH INCREMENTAL 1 MINUTE: Performed by: SURGERY

## 2024-06-19 PROCEDURE — A47562 PR LAP,CHOLECYSTECTOMY: Performed by: ANESTHESIOLOGY

## 2024-06-19 PROCEDURE — 2500000005 HC RX 250 GENERAL PHARMACY W/O HCPCS: Performed by: SURGERY

## 2024-06-19 PROCEDURE — 2720000007 HC OR 272 NO HCPCS: Performed by: SURGERY

## 2024-06-19 PROCEDURE — 7100000001 HC RECOVERY ROOM TIME - INITIAL BASE CHARGE: Performed by: SURGERY

## 2024-06-19 PROCEDURE — C1889 IMPLANT/INSERT DEVICE, NOC: HCPCS | Performed by: SURGERY

## 2024-06-19 PROCEDURE — 7100000002 HC RECOVERY ROOM TIME - EACH INCREMENTAL 1 MINUTE: Performed by: SURGERY

## 2024-06-19 PROCEDURE — 2500000005 HC RX 250 GENERAL PHARMACY W/O HCPCS: Performed by: ANESTHESIOLOGY

## 2024-06-19 PROCEDURE — 3600000004 HC OR TIME - INITIAL BASE CHARGE - PROCEDURE LEVEL FOUR: Performed by: SURGERY

## 2024-06-19 PROCEDURE — 3600000009 HC OR TIME - EACH INCREMENTAL 1 MINUTE - PROCEDURE LEVEL FOUR: Performed by: SURGERY

## 2024-06-19 RX ORDER — ALBUMIN HUMAN 50 G/1000ML
SOLUTION INTRAVENOUS AS NEEDED
Status: DISCONTINUED | OUTPATIENT
Start: 2024-06-19 | End: 2024-06-19

## 2024-06-19 RX ORDER — ROCURONIUM BROMIDE 10 MG/ML
INJECTION, SOLUTION INTRAVENOUS AS NEEDED
Status: DISCONTINUED | OUTPATIENT
Start: 2024-06-19 | End: 2024-06-19

## 2024-06-19 RX ORDER — PHENYLEPHRINE HCL IN 0.9% NACL 0.4MG/10ML
SYRINGE (ML) INTRAVENOUS AS NEEDED
Status: DISCONTINUED | OUTPATIENT
Start: 2024-06-19 | End: 2024-06-19

## 2024-06-19 RX ORDER — ALBUTEROL SULFATE 0.83 MG/ML
2.5 SOLUTION RESPIRATORY (INHALATION) ONCE AS NEEDED
Status: DISCONTINUED | OUTPATIENT
Start: 2024-06-19 | End: 2024-06-19 | Stop reason: HOSPADM

## 2024-06-19 RX ORDER — CEFAZOLIN SODIUM 2 G/100ML
2 INJECTION, SOLUTION INTRAVENOUS ONCE
Status: DISCONTINUED | OUTPATIENT
Start: 2024-06-19 | End: 2024-06-19 | Stop reason: HOSPADM

## 2024-06-19 RX ORDER — FENTANYL CITRATE 50 UG/ML
INJECTION, SOLUTION INTRAMUSCULAR; INTRAVENOUS AS NEEDED
Status: DISCONTINUED | OUTPATIENT
Start: 2024-06-19 | End: 2024-06-19

## 2024-06-19 RX ORDER — CYCLOBENZAPRINE HCL 5 MG
5 TABLET ORAL 3 TIMES DAILY
Qty: 21 TABLET | Refills: 0 | Status: SHIPPED | OUTPATIENT
Start: 2024-06-19 | End: 2024-06-26

## 2024-06-19 RX ORDER — ACETAMINOPHEN 325 MG/1
650 TABLET ORAL EVERY 4 HOURS PRN
Status: DISCONTINUED | OUTPATIENT
Start: 2024-06-19 | End: 2024-06-19 | Stop reason: HOSPADM

## 2024-06-19 RX ORDER — INDOCYANINE GREEN AND WATER 25 MG
2.5 KIT INJECTION ONCE
Status: COMPLETED | OUTPATIENT
Start: 2024-06-19 | End: 2024-06-19

## 2024-06-19 RX ORDER — ONDANSETRON HYDROCHLORIDE 2 MG/ML
8 INJECTION, SOLUTION INTRAVENOUS ONCE
Status: COMPLETED | OUTPATIENT
Start: 2024-06-19 | End: 2024-06-19

## 2024-06-19 RX ORDER — ONDANSETRON HYDROCHLORIDE 2 MG/ML
INJECTION, SOLUTION INTRAVENOUS AS NEEDED
Status: DISCONTINUED | OUTPATIENT
Start: 2024-06-19 | End: 2024-06-19

## 2024-06-19 RX ORDER — CEFAZOLIN 1 G/1
INJECTION, POWDER, FOR SOLUTION INTRAVENOUS AS NEEDED
Status: DISCONTINUED | OUTPATIENT
Start: 2024-06-19 | End: 2024-06-19

## 2024-06-19 RX ORDER — ENOXAPARIN SODIUM 100 MG/ML
30 INJECTION SUBCUTANEOUS ONCE
Status: COMPLETED | OUTPATIENT
Start: 2024-06-19 | End: 2024-06-19

## 2024-06-19 RX ORDER — VASOPRESSIN 20 U/ML
INJECTION PARENTERAL AS NEEDED
Status: DISCONTINUED | OUTPATIENT
Start: 2024-06-19 | End: 2024-06-19

## 2024-06-19 RX ORDER — MIDAZOLAM HYDROCHLORIDE 1 MG/ML
INJECTION INTRAMUSCULAR; INTRAVENOUS AS NEEDED
Status: DISCONTINUED | OUTPATIENT
Start: 2024-06-19 | End: 2024-06-19

## 2024-06-19 RX ORDER — PROPOFOL 10 MG/ML
INJECTION, EMULSION INTRAVENOUS AS NEEDED
Status: DISCONTINUED | OUTPATIENT
Start: 2024-06-19 | End: 2024-06-19

## 2024-06-19 RX ORDER — SODIUM CHLORIDE, SODIUM LACTATE, POTASSIUM CHLORIDE, CALCIUM CHLORIDE 600; 310; 30; 20 MG/100ML; MG/100ML; MG/100ML; MG/100ML
100 INJECTION, SOLUTION INTRAVENOUS CONTINUOUS
Status: DISCONTINUED | OUTPATIENT
Start: 2024-06-19 | End: 2024-06-19 | Stop reason: HOSPADM

## 2024-06-19 RX ORDER — LIDOCAINE HYDROCHLORIDE 20 MG/ML
INJECTION, SOLUTION INFILTRATION; PERINEURAL AS NEEDED
Status: DISCONTINUED | OUTPATIENT
Start: 2024-06-19 | End: 2024-06-19

## 2024-06-19 RX ORDER — HYDROCODONE BITARTRATE AND ACETAMINOPHEN 5; 325 MG/1; MG/1
1 TABLET ORAL EVERY 6 HOURS PRN
Qty: 20 TABLET | Refills: 0 | Status: SHIPPED | OUTPATIENT
Start: 2024-06-19

## 2024-06-19 RX ORDER — DOCUSATE SODIUM 100 MG/1
100 CAPSULE, LIQUID FILLED ORAL 2 TIMES DAILY
Qty: 14 CAPSULE | Refills: 0 | Status: SHIPPED | OUTPATIENT
Start: 2024-06-19 | End: 2024-06-26

## 2024-06-19 SDOH — HEALTH STABILITY: MENTAL HEALTH: CURRENT SMOKER: 0

## 2024-06-19 ASSESSMENT — COLUMBIA-SUICIDE SEVERITY RATING SCALE - C-SSRS
2. HAVE YOU ACTUALLY HAD ANY THOUGHTS OF KILLING YOURSELF?: NO
6. HAVE YOU EVER DONE ANYTHING, STARTED TO DO ANYTHING, OR PREPARED TO DO ANYTHING TO END YOUR LIFE?: NO
1. IN THE PAST MONTH, HAVE YOU WISHED YOU WERE DEAD OR WISHED YOU COULD GO TO SLEEP AND NOT WAKE UP?: NO

## 2024-06-19 ASSESSMENT — PAIN - FUNCTIONAL ASSESSMENT
PAIN_FUNCTIONAL_ASSESSMENT: 0-10

## 2024-06-19 ASSESSMENT — PAIN SCALES - GENERAL
PAINLEVEL_OUTOF10: 7
PAINLEVEL_OUTOF10: 5 - MODERATE PAIN
PAIN_LEVEL: 2
PAINLEVEL_OUTOF10: 5 - MODERATE PAIN
PAINLEVEL_OUTOF10: 5 - MODERATE PAIN
PAINLEVEL_OUTOF10: 0 - NO PAIN

## 2024-06-19 ASSESSMENT — PAIN DESCRIPTION - ORIENTATION
ORIENTATION: RIGHT
ORIENTATION: RIGHT

## 2024-06-19 ASSESSMENT — ENCOUNTER SYMPTOMS
OCCASIONAL FEELINGS OF UNSTEADINESS: 0
LOSS OF SENSATION IN FEET: 0
DEPRESSION: 0

## 2024-06-19 ASSESSMENT — PAIN DESCRIPTION - LOCATION
LOCATION: ABDOMEN
LOCATION: ABDOMEN

## 2024-06-19 NOTE — INTERVAL H&P NOTE
H&P reviewed. The patient was examined and there are no changes to the H&P.    US showed fatty liver. Will perform liver biopsy during surgery as well.

## 2024-06-19 NOTE — ANESTHESIA PROCEDURE NOTES
Peripheral IV  Date/Time: 6/19/2024 10:10 AM      Placement  Needle size: 20 G  Laterality: right  Location: hand  Local anesthetic: none  Site prep: alcohol  Attempts: 1

## 2024-06-19 NOTE — ANESTHESIA PREPROCEDURE EVALUATION
Patient: Adrian Vides    Procedure Information       Date/Time: 06/19/24 0730    Procedure: CHOLECYSTECTOMY ROBOT-ASSITED    Location: [facility] / [facility]    Surgeons: Fatmata Block MD     Vitals:    06/19/24 0618   BP: 161/82   Pulse: 88   Resp: 16   Temp: 37 °C (98.6 °F)   SpO2: 99%       Past Surgical History:   Procedure Laterality Date    APPENDECTOMY      URETHRA SURGERY  2008    urethral reconstruction     Past Medical History:   Diagnosis Date    Dyskinesia of gallbladder     Fatty liver     GERD (gastroesophageal reflux disease)     Hyperlipidemia     Hypertension     Type 2 diabetes mellitus (Multi)        Current Facility-Administered Medications:     ceFAZolin in dextrose (iso-os) (Ancef) IVPB 2 g, 2 g, intravenous, Once, Fatmata Block MD    lactated Ringer's infusion, 100 mL/hr, intravenous, Continuous, Dinh Simons MD, Last Rate: 100 mL/hr at 06/19/24 0657, 100 mL/hr at 06/19/24 0657    Facility-Administered Medications Ordered in Other Encounters:     lactated Ringer's bolus, , intravenous, Continuous PRN, Francisco Friedman, APRN-CRNA, New Bag at 06/19/24 0716  Prior to Admission medications    Medication Sig Start Date End Date Taking? Authorizing Provider   amitriptyline (Elavil) 25 mg tablet Take 1 tablet (25 mg) by mouth once daily at bedtime. Patient ONLY TAKES 1/2 PILL   Yes Historical Provider, MD   atorvastatin (Lipitor) 40 mg tablet Take 1 tablet (40 mg) by mouth once daily in the evening.   Yes Historical Provider, MD   cholestyramine (Questran) 4 gram powder Take by mouth 2 times daily (morning and late afternoon).   Yes Historical Provider, MD   fenofibrate (Tricor) 145 mg tablet Take 1 tablet (145 mg) by mouth once daily in the evening.   Yes Historical Provider, MD   icosapent ethyL (Vascepa) 1 gram capsule Take 2 capsules (2 g) by mouth 2 times daily (morning and late afternoon).   Yes Historical Provider, MD   insulin glargine,hum.rec.anlog (TOUJEO SOLOSTAR U-300 INSULIN SUBQ)  "Inject 136 Units/day under the skin once daily. Take as directed per insulin instructions.   Yes Historical Provider, MD   lisinopril 5 mg tablet Take 1 tablet (5 mg) by mouth once daily at bedtime.   Yes Historical Provider, MD   metFORMIN (Glucophage) 500 mg tablet Take 1 tablet (500 mg) by mouth 2 times daily (morning and late afternoon).   Yes Historical Provider, MD   omeprazole (PriLOSEC) 20 mg DR capsule Take 1 capsule (20 mg) by mouth once daily in the morning. Take before meals. Do not crush or chew.   Yes Historical Provider, MD   semaglutide (Ozempic) 2 mg/dose (8 mg/3 mL) pen injector Inject 2 mg under the skin every 7 days. Patient takes on Fridays   Yes Historical Provider, MD     Allergies   Allergen Reactions    Sulfa (Sulfonamide Antibiotics) Rash    Toradol [Ketorolac] Rash    Tramadol Rash     Social History     Tobacco Use    Smoking status: Never    Smokeless tobacco: Never   Substance Use Topics    Alcohol use: Not Currently         Chemistry    Lab Results   Component Value Date/Time     06/12/2024 1124    K 4.4 06/12/2024 1124     06/12/2024 1124    CO2 26 06/12/2024 1124    BUN 20 06/12/2024 1124    CREATININE 0.97 06/12/2024 1124    Lab Results   Component Value Date/Time    CALCIUM 10.0 06/12/2024 1124    ALKPHOS 27 (L) 06/12/2024 1124    AST 29 06/12/2024 1124    ALT 45 06/12/2024 1124    BILITOT 0.4 06/12/2024 1124          Lab Results   Component Value Date/Time    WBC 6.1 06/12/2024 1124    HGB 12.9 (L) 06/12/2024 1124    HCT 38.0 (L) 06/12/2024 1124     06/12/2024 1124     No results found for: \"PROTIME\", \"PTT\", \"INR\"  Encounter Date: 06/12/24   ECG 12 Lead   Result Value    Ventricular Rate 83    Atrial Rate 83    ID Interval 170    QRS Duration 88    QT Interval 346    QTC Calculation(Bazett) 406    R Axis -9    T Axis 41    QRS Count 13    Q Onset 224    P Onset 139    P Offset 177    T Offset 397    QTC Fredericia 385    Narrative    Normal sinus rhythm  Normal " ECG  No previous ECGs available     No results found for this or any previous visit from the past 1095 days.        Relevant Problems   No relevant active problems       Clinical information reviewed:    Allergies  Meds               NPO Detail:  NPO/Void Status  Date of Last Liquid: 06/18/24  Date of Last Solid: 06/18/24         Physical Exam    Airway  Mallampati: II  TM distance: >3 FB     Cardiovascular - normal exam     Dental    Pulmonary - normal exam     Abdominal - normal exam           Anesthesia Plan    History of general anesthesia?: yes  History of complications of general anesthesia?: no    ASA 3     general     The patient is not a current smoker.    intravenous induction   Postoperative administration of opioids is intended.  Trial extubation is planned.  Anesthetic plan and risks discussed with patient.  Use of blood products discussed with patient who.    Plan discussed with CRNA and attending.

## 2024-06-19 NOTE — ANESTHESIA PROCEDURE NOTES
Airway  Date/Time: 6/19/2024 7:44 AM  Urgency: elective    Airway not difficult    Staffing  Performed: CRNA   Authorized by: JENNYFER Caldera    Performed by: JENNYFER Caldera  Patient location during procedure: OR    Indications and Patient Condition  Indications for airway management: anesthesia and airway protection  Spontaneous ventilation: present  Sedation level: deep  Preoxygenated: yes  Patient position: sniffing  MILS maintained throughout  Mask difficulty assessment: 0 - not attempted    Final Airway Details  Final airway type: endotracheal airway      Successful airway: ETT  Cuffed: yes   Successful intubation technique: direct laryngoscopy  Endotracheal tube insertion site: oral  Blade: Jeffery  Blade size: #4  ETT size (mm): 8.0  Cormack-Lehane Classification: grade I - full view of glottis  Placement verified by: chest auscultation and capnometry   Measured from: lips  ETT to lips (cm): 23  Number of attempts at approach: 1

## 2024-06-19 NOTE — ANESTHESIA POSTPROCEDURE EVALUATION
Patient: Adrian Vides    Procedure Summary       Date: 06/19/24 Room / Location: [facility] / [facility]    Anesthesia Start: 0729 Anesthesia Stop: 1011    Procedure: CHOLECYSTECTOMY ROBOT-ASSITED (Abdomen) Diagnosis:       Biliary dyskinesia      (Biliary dyskinesia [K82.8])    Surgeons: Fatmata Block MD Responsible Provider: Dinh Simons MD    Anesthesia Type: general ASA Status: 3            Anesthesia Type: general    Vitals Value Taken Time   BP  06/19/24 1017   Temp  06/19/24 1017   Pulse 83 06/19/24 1011   Resp  06/19/24 1017   SpO2 98 % 06/19/24 1011   Vitals shown include unfiled device data.    Anesthesia Post Evaluation    Patient location during evaluation: PACU  Patient participation: complete - patient participated  Level of consciousness: awake  Pain score: 2  Pain management: adequate  Multimodal analgesia pain management approach  Airway patency: patent  Two or more strategies used to mitigate risk of obstructive sleep apnea  Cardiovascular status: acceptable  Respiratory status: acceptable  Hydration status: acceptable  Postoperative Nausea and Vomiting: none      No notable events documented.

## 2024-06-19 NOTE — DISCHARGE INSTRUCTIONS
PATIENT INSTRUCTIONS AFTER GALL BLADDER SURGERY (CHOLECYSTECTOMY)    FOLLOW-UP: Please make an appointment with Dr. Fatmata Block in 2 week. Call Dr. Block office or come to Emergency Department (ED)  immediately if you have any fevers greater than 102.5 degrees Fahrenheit, drainage from your wound that is not clear or looks infected, persistent bleeding, increasing abdominal pain, problems urinating, or persistent nausea/vomiting or any concerns. You should be aware that you may have right shoulder pain after surgery and that this will progressively go away. This is called 'referred pain' and is from the area of the gallbladder. It can also be caused by gas that may be trapped under the diaphragm from the surgery, especially if it was performed laparoscopically through mini-incisions. This gas will progressively get reabsorbed by your body.     WOUND CARE INSTRUCTIONS: Keep a dry, clean dressing on the wound if there is drainage. The initial bandage may be removed after 24 hours. Once the wound has quit draining, you may leave it open to air. If clothing rubs against the wound or causes irritation and the wound is not draining, you may cover it with a dry dressing during the daytime. Try to keep the wound dry and avoid ointments on the wound unless directed to do so. If the wound becomes bright red and painful or starts to drain infected material that is not clear, please contact Dr. Block office immediately.  You should also call if you begin to drain fluid that is thin and greenish-brown from the wound and appears to look like bile. If the wound is mildly pink and has a thick firm ridge underneath it, this is normal and is referred to as a healing ridge. This will resolve over the next 4-6 weeks.    If surgery is done in open fashion, staples/sutures will be removed at your follow up appointment if staples/sutures are used to close the skin.  If surgery is done laparoscopically, the incisions are usually covered with skin  glue. The sutures are under the skin. No suture removal is needed.     Some bruise around the incision is normal. You should call Dr. Block office or come to ED with no delay if the bruise is expanding and painful.     DIET: You may eat any foods that you can tolerate. It is a good idea to eat a high fiber diet, and take in plenty of fluids to prevent constipation. If you do become constipated, you may want to take a mild laxative or take Dulcolax tablets on a daily basis until your bowel habits are regular. After recent abdominal surgery, constipation can be very uncomfortable and straining.    ACTIVITY: You are encouraged to cough and take deep breaths, or if you were given one, use your incentive spirometer every 15-30 minutes when awake. This will help prevent respiratory complications and low grade fevers post-operatively. You may want to hug a pillow when coughing and sneezing to add additional support to the surgical area which will decrease pain during these times. You are encouraged to walk and engage in light activity for the next two weeks. You should not lift more than 20 pounds during this time frame as it could put you at increased risk for a post-operative hernia. Twenty pounds is roughly equivalent to a plastic bag of groceries.     If surgery is done in open fashion, you should not lift more than 20 pounds or do sit ups for 6-8 weeks. You should also wear an abdominal binder for 6-8 weeks. You do not need to wear binder when you sleep.      MEDICATIONS: Try to take narcotic medications and anti-inflammatory medications, such as Tylenol, ibuprofen, Naprosyn, etc., with food. This will minimize stomach upset from the medication. Should you develop nausea and vomiting from the pain medication, or develop a rash, please discontinue the medication and contact your physician. You should not drive, make important decisions, or operate machinery when taking narcotic pain medication.    IF YOU TAKE BLOOD  THINNERS SUCH AS PLAVIX, XARELTO, ELIQUIS, COUMADIN OR OTHERS, YOU CAN RESTART THE MEDICATION ON THIS DATE:   6/21      QUESTIONS: Please feel free to call Dr. Block office if you have any questions.

## 2024-06-19 NOTE — OP NOTE
CHOLECYSTECTOMY ROBOT-ASSITED Operative Note     Date: 2024  OR Location: [facility] OR    Name: Adrian Vides, : 1970, Age: 53 y.o., MRN: [id number], Sex: male    Diagnosis  Pre-op Diagnosis     * Biliary dyskinesia [K82.8] Post-op Diagnosis     * Biliary dyskinesia [K82.8]     Procedures  CHOLECYSTECTOMY ROBOT-ASSITED  [id number] - DE LAP,CHOLECYSTECTOMY  TAP block, bilateral   Wedge liver biopsy     Surgeons      * Fatmata Block - Primary    Resident/Fellow/Other Assistant:  Surgeons and Role:  * No surgeons found with a matching role *    Procedure Summary  Anesthesia: Consult  ASA: III  Anesthesia Staff: Anesthesiologist: Dinh Simons MD  CRNA: NAHID Caldera-CRNA  Estimated Blood Loss: 15mL  Intra-op Medications:   Administrations occurring from 0730 to 0945 on 24:   Medication Name Total Dose   BUPivacaine-EPINEPHrine (Marcaine w/EPI) 20 mL, lidocaine (Xylocaine) 20 mL syringe 35 mL              Anesthesia Record               Intraprocedure I/O Totals          Intake    LR bolus 1000.00 mL    Total Intake 1000 mL          Specimen:   ID Type Source Tests Collected by Time   1 : LIVER BIOPSY Tissue LIVER BIOPSY LEFT SURGICAL PATHOLOGY EXAM Fatmata Block MD 2024   2 : GALLBLADDER Tissue GALLBLADDER CHOLECYSTECTOMY SURGICAL PATHOLOGY EXAM Fatmata Block MD 2024 0928        Staff:   Glenny: Randi  Circulator: Arcelia Block Person: Garland Reveles Scrub: Maurilio  Circulator: Stephany         Findings: see below     Indications: Adrian Vides is an 53 y.o. male who is having surgery for Biliary dyskinesia [K82.8] and liver wedge biopsy for severe fatty liver on US.     The patient was seen in the preoperative area. The risks, benefits, complications, treatment options, non-operative alternatives, expected recovery and outcomes were discussed with the patient. The possibilities of reaction to medication, pulmonary aspiration, injury to surrounding structures, bleeding, recurrent infection, the  need for additional procedures, failure to diagnose a condition, and creating a complication requiring transfusion or operation were discussed with the patient. The patient concurred with the proposed plan, giving informed consent.  The site of surgery was properly noted/marked if necessary per policy. The patient has been actively warmed in preoperative area. Preoperative antibiotics have been ordered and given within 1 hours of incision. Venous thrombosis prophylaxis have been ordered including bilateral sequential compression devices and chemical prophylaxis    Procedure Details:   After general anesthesia was administered, the patient was prepped and draped in the usual sterile fashion.  A supra-umbilical incision was carried down through the skin and subcutaneous tissue. A 12 mm port was placed using open Bowers technique. Pneumoperitoneum was created by insufflating with CO2 with pressure at 15 mm Hg.  Two 8 mm robotic ports were placed on either side of abdomen about 10cm away from the umbilical port. Another 8mm port was placed in left upper quadran lateral and superior to the left sided port for 4th robot arm. A 8mm port was then inserted through the Shala port for camera. After we docked the robot, I then scrubbed out to the consol to proceed with the surgery.  The gallbladder was grasped by Prograsp through the 4th arm port. The gallbladder was retracted up. The cystic duct and cystic artery were dissected from the surrounding structures. There was stricture in the gallbladder proximal to the infundibulum. After careful dissection, critical view of safety was obtained. Firefly technique was used to help identify the bile duct. The cystic duct was controlled with Hemolock clips (two on the distal side and one on the gallbladder side) and divided. The cystic artery was clipped and divided. The gallbladder was taken from the liver bed with the hook cautery. The gallbladder was intra hepatic. The gallbladder  posterior wall was fused to the liver bed. We had to leave portion of posterior wall in place to avoid injury to liver and bleeding. The mucosa was cauterized. Spilled bile was cleaned up with suction right away.  The gallbladder bed appeared to be hemostatic. We then did wedge liver biopsy at the anterior edge of right lobe.  The gallbladder and liver specimen was placed in an EndoCatch bag which was temporarily stored on the omentum.  Residual blood was cleaned in the right upper quadrant. Hemostasis appeared to be satisfactory. The robot was then undocked. I scrubbed back in.  We then performed TAP block with 1% Lidocaine and 0.5% Marcaine 1:1 mixture on both sides. The EndoCatch bag was then removed from the umbilical port site without difficulty. All ports were removed under direct visualization. The abdomen was deflated.  The fascia at umbilicus was closed with #0 Vicryl. The subcutaneous tissue at each site was infiltrated with 1% Lidocaine and 0.25% Marcaine mixture (1:1).  Skin was closed  with subcuticular 4-0 Vicryl.  Skin glue was applied.                  Complications:  None; patient tolerated the procedure well.    Disposition: PACU - hemodynamically stable.  Condition: stable           Attending Attestation: I was present and scrubbed for the entire procedure.    Garypat Mckayla  Phone Number: [phone]

## 2024-06-24 LAB
ATRIAL RATE: 83 BPM
LABORATORY COMMENT REPORT: NORMAL
P OFFSET: 177 MS
P ONSET: 139 MS
PATH REPORT.COMMENTS IMP SPEC: NORMAL
PATH REPORT.FINAL DX SPEC: NORMAL
PATH REPORT.GROSS SPEC: NORMAL
PATH REPORT.TOTAL CANCER: NORMAL
PR INTERVAL: 170 MS
Q ONSET: 224 MS
QRS COUNT: 13 BEATS
QRS DURATION: 88 MS
QT INTERVAL: 346 MS
QTC CALCULATION(BAZETT): 406 MS
QTC FREDERICIA: 385 MS
R AXIS: -9 DEGREES
RESIDENT REVIEW: NORMAL
T AXIS: 41 DEGREES
T OFFSET: 397 MS
VENTRICULAR RATE: 83 BPM

## 2024-07-02 ENCOUNTER — APPOINTMENT (OUTPATIENT)
Dept: SURGERY | Facility: CLINIC | Age: 54
End: 2024-07-02
Payer: COMMERCIAL

## 2024-07-02 VITALS
DIASTOLIC BLOOD PRESSURE: 107 MMHG | BODY MASS INDEX: 27.36 KG/M2 | OXYGEN SATURATION: 97 % | WEIGHT: 190.7 LBS | SYSTOLIC BLOOD PRESSURE: 141 MMHG | HEART RATE: 96 BPM

## 2024-07-02 DIAGNOSIS — K76.0 FATTY LIVER: Primary | ICD-10-CM

## 2024-07-02 DIAGNOSIS — Z09 POSTOPERATIVE EXAMINATION: ICD-10-CM

## 2024-07-02 PROCEDURE — 99024 POSTOP FOLLOW-UP VISIT: CPT | Performed by: SURGERY

## 2024-07-02 NOTE — LETTER
July 2, 2024     Celestine Cochran MD  [address]  [place] [address]    Patient: Adrian Vides   YOB: 1970   Date of Visit: 7/2/2024       Dear Dr. Celestine Cochran MD:    Thank you for referring Adrian Vides to me for evaluation. Below are my notes for this consultation.  If you have questions, please do not hesitate to call me. I look forward to following your patient along with you.       Sincerely,     Fatmata Block MD      CC: No Recipients  ______________________________________________________________________________________    Subjective  Patient ID: Adrian Vides is a 53 y.o. male who presents for Post-op (6/19/2024 Robotic Veronika ). And liver wedge biopsy     HPI  Doing well  Eating better after surgery   However still has diarrhea which was present before surgery. He was taking cholestyramine which did not appear to help much.       Review of Systems   All other systems reviewed and are negative.    Pathology:       Component    FINAL DIAGNOSIS   A. LIVER, LEFT LOBE, NEEDLE-CORE BIOPSY:  - Mild macrovesicular steatosis (20-30%) with no definitive evidence of active steatohepatitis (see comment).  - Mild portal and pericellular fibrosis.     B. GALLBLADDER, CHOLECYSTECTOMY:  - Chronic cholecystitis with benign papillary mucosal hyperplasia.                   Objective  Physical Exam  Constitutional:       Appearance: Normal appearance.   Pulmonary:      Effort: Pulmonary effort is normal.   Abdominal:      Comments: Soft, NT,ND. Incisions healed well.            Assessment/Plan  S/P robotic veronika and liver wedge biopsy   Adequate progress from surgery aspect     Discussed pathology as above, will refer to GI for fatty liver and mild fibrosis   Regular diet   Advised fiber to see if diarrhea better   Follow up with me as needed   Call if any concern          Fatmata Block MD 07/02/24 2:07 PM

## 2024-07-02 NOTE — PROGRESS NOTES
Subjective   Patient ID: Adrian Vides is a 53 y.o. male who presents for Post-op (6/19/2024 Robotic Veronika ). And liver wedge biopsy     HPI  Doing well  Eating better after surgery   However still has diarrhea which was present before surgery. He was taking cholestyramine which did not appear to help much.       Review of Systems   All other systems reviewed and are negative.    Pathology:       Component    FINAL DIAGNOSIS   A. LIVER, LEFT LOBE, NEEDLE-CORE BIOPSY:  - Mild macrovesicular steatosis (20-30%) with no definitive evidence of active steatohepatitis (see comment).  - Mild portal and pericellular fibrosis.     B. GALLBLADDER, CHOLECYSTECTOMY:  - Chronic cholecystitis with benign papillary mucosal hyperplasia.                   Objective   Physical Exam  Constitutional:       Appearance: Normal appearance.   Pulmonary:      Effort: Pulmonary effort is normal.   Abdominal:      Comments: Soft, NT,ND. Incisions healed well.            Assessment/Plan   S/P robotic veronika and liver wedge biopsy   Adequate progress from surgery aspect     Discussed pathology as above, will refer to GI for fatty liver and mild fibrosis   Regular diet   Advised fiber to see if diarrhea better   Follow up with me as needed   Call if any concern          Fatmata Block MD 07/02/24 2:07 PM

## 2024-08-22 ENCOUNTER — HOSPITAL ENCOUNTER (OUTPATIENT)
Dept: RADIOLOGY | Facility: EXTERNAL LOCATION | Age: 54
Discharge: HOME | End: 2024-08-22

## 2024-09-03 ENCOUNTER — APPOINTMENT (OUTPATIENT)
Dept: GASTROENTEROLOGY | Facility: CLINIC | Age: 54
End: 2024-09-03
Payer: COMMERCIAL

## 2024-09-03 VITALS
HEIGHT: 70 IN | SYSTOLIC BLOOD PRESSURE: 135 MMHG | DIASTOLIC BLOOD PRESSURE: 78 MMHG | BODY MASS INDEX: 27.63 KG/M2 | OXYGEN SATURATION: 98 % | HEART RATE: 95 BPM | WEIGHT: 193 LBS

## 2024-09-03 DIAGNOSIS — K52.9 CHRONIC DIARRHEA: Primary | ICD-10-CM

## 2024-09-03 DIAGNOSIS — K76.0 FATTY LIVER: ICD-10-CM

## 2024-09-03 PROCEDURE — 99204 OFFICE O/P NEW MOD 45 MIN: CPT | Performed by: NURSE PRACTITIONER

## 2024-09-03 PROCEDURE — 1036F TOBACCO NON-USER: CPT | Performed by: NURSE PRACTITIONER

## 2024-09-03 PROCEDURE — 3008F BODY MASS INDEX DOCD: CPT | Performed by: NURSE PRACTITIONER

## 2024-09-03 ASSESSMENT — ENCOUNTER SYMPTOMS
COUGH: 0
ADENOPATHY: 0
WOUND: 0
ROS GI COMMENTS: SEE HPI
WEAKNESS: 0
FEVER: 0
DIZZINESS: 0
CONFUSION: 0
ARTHRALGIAS: 0
DIFFICULTY URINATING: 0
PALPITATIONS: 0
SHORTNESS OF BREATH: 0
TROUBLE SWALLOWING: 0
BRUISES/BLEEDS EASILY: 0
JOINT SWELLING: 0
SORE THROAT: 0
CHILLS: 0

## 2024-09-03 NOTE — PROGRESS NOTES
Subjective   Patient ID: Adrian Vides is a 53 y.o. male with PMH of fatty liver, GERD, HTN, HLD, DM2, cholecystectomy (6/2024) and appendectomy (1983) who was referred by Fatmata Block MD for Abdominal Pain (Belching, sour stomach, nausea ) and Diarrhea (HIDA 4/12/24, gallbladder removed 6/19/24/Referred after liver biopsy /Last colon in Jan 2024).     Patient's PCP is Celestine Cochran MD     Patient reports abdominal pain, belching, nausea, and diarrhea for many months. He was following at [facility] and reports extensive work up with labs and EGD/colonoscopy in January 2024; he does not believe there was any significant findings. He remembers specifically being told there was no IBD or celiac disease. He cannot remember for sure if he had Barretts or not, but recalls being diagnosed with Barretts several years ago. He is currently taking omeprazole 20mg daily.     He reports symptoms occur intermittently but tend to all occur together. Unclear of any specific triggering foods. No unintended weight loss, dysphagia, melena, constipation, or hematochezia.     He also recently had a cholecystectomy in June 2024, but symptoms predated this and he had no improvement with cholestyramine afterwards. Liver biopsy done during cholecystectomy showed liver steatosis with mild fibrosis. Patients triglycerides recently elevated, however, he reports they previously have been much higher and are coming down. A1C has also been improving. He drinks about 1 alcoholic beverage per week.       Summary of endoscopies:  - EGD 2/2024: esophagus with minimal reflux changes, mild active gastritis, normal duodenum.   - Colonoscopy 2/2024: diverticulosis, otherwise normal.   Social Hx:  Tobacco: none  Etoh: 1/week   Recreational drug use: none  NSAIDs: none      Family Hx:  Father -- liver cancer     Review of Systems:  Review of Systems   Constitutional:  Negative for chills and fever.   HENT:  Negative for sore throat and trouble  swallowing.    Respiratory:  Negative for cough and shortness of breath.    Cardiovascular:  Negative for chest pain and palpitations.   Gastrointestinal:         SEE HPI   Endocrine: Negative for cold intolerance and heat intolerance.   Genitourinary:  Negative for difficulty urinating.   Musculoskeletal:  Negative for arthralgias and joint swelling.   Skin:  Negative for rash and wound.   Neurological:  Negative for dizziness and weakness.   Hematological:  Negative for adenopathy. Does not bruise/bleed easily.   Psychiatric/Behavioral:  Negative for confusion.         Medications:  Prior to Admission medications    Medication Sig Start Date End Date Taking? Authorizing Provider   amitriptyline (Elavil) 25 mg tablet Take 1 tablet (25 mg) by mouth once daily at bedtime. Patient ONLY TAKES 1/2 PILL   Yes Historical Provider, MD   atorvastatin (Lipitor) 40 mg tablet Take 1 tablet (40 mg) by mouth once daily in the evening.   Yes Historical Provider, MD   fenofibrate (Tricor) 145 mg tablet Take 1 tablet (145 mg) by mouth once daily in the evening.   Yes Historical Provider, MD   icosapent ethyL (Vascepa) 1 gram capsule Take 2 capsules (2 g) by mouth 2 times daily (morning and late afternoon).   Yes Historical Provider, MD   insulin glargine,hum.rec.anlog (TOUJEO SOLOSTAR U-300 INSULIN SUBQ) Inject 136 Units/day under the skin once daily. Take as directed per insulin instructions.   Yes Historical Provider, MD   lisinopril 5 mg tablet Take 1 tablet (5 mg) by mouth once daily at bedtime.   Yes Historical Provider, MD   metFORMIN (Glucophage) 500 mg tablet Take 1 tablet (500 mg) by mouth 2 times daily (morning and late afternoon).   Yes Historical Provider, MD   omeprazole (PriLOSEC) 20 mg DR capsule Take 1 capsule (20 mg) by mouth once daily in the morning. Take before meals. Do not crush or chew.   Yes Historical Provider, MD   semaglutide (Ozempic) 2 mg/dose (8 mg/3 mL) pen injector Inject 2 mg under the skin every 7  days. Patient takes on Fridays   Yes Historical Provider, MD   HYDROcodone-acetaminophen (Norco) 5-325 mg tablet Take 1 tablet by mouth every 6 hours if needed for severe pain (7 - 10) or moderate pain (4 - 6) (ok to take 2 pills for severe pain). 6/19/24   Fatmata Block MD   cholestyramine (Questran) 4 gram powder Take by mouth 2 times daily (morning and late afternoon).  9/3/24  Historical Provider, MD       Allergies:  Sulfa (sulfonamide antibiotics), Toradol [ketorolac], and Tramadol    Past Medical History:  He has a past medical history of Dyskinesia of gallbladder, Fatty liver, GERD (gastroesophageal reflux disease), Hyperlipidemia, Hypertension, and Type 2 diabetes mellitus (Multi).    Past Surgical History:  He has a past surgical history that includes Appendectomy and Urethra surgery (2008).    Social History:  He reports that he has never smoked. He has never used smokeless tobacco. He reports that he does not currently use alcohol. He reports that he does not use drugs.    Objective   Physical exam:  Physical Exam  Constitutional:       General: He is not in acute distress.     Appearance: Normal appearance.   HENT:      Mouth/Throat:      Mouth: Mucous membranes are moist.      Comments: pink  Eyes:      Conjunctiva/sclera: Conjunctivae normal.      Pupils: Pupils are equal, round, and reactive to light.   Cardiovascular:      Rate and Rhythm: Normal rate and regular rhythm.      Heart sounds: No murmur heard.  Pulmonary:      Effort: Pulmonary effort is normal.      Breath sounds: Normal breath sounds.   Abdominal:      General: Bowel sounds are normal. There is no distension.      Palpations: Abdomen is soft.      Tenderness: There is no abdominal tenderness. There is no guarding.   Skin:     General: Skin is warm and dry.      Coloration: Skin is not jaundiced.   Neurological:      Mental Status: He is alert and oriented to person, place, and time.   Psychiatric:         Mood and Affect: Mood normal.          Behavior: Behavior normal.          Assessment/Plan     Abdominal pain, N/V, diarrhea   Multiple GI symptoms occurring intermittently but all together. Recent cholecystectomy improved abdominal pain but not the other symptoms and diarrhea did not improve with cholestyramine. Reports extensive GI work up at [facility] in January; have sent records release. Will check pancreatic elastase and fecal calprotectin as well. Will advise further after results and records obtained.     2. Fatty liver   Liver biopsy done during cholecystectomy showed hepatic steatosis with mild fibrosis. Discusses that about 20-25% of people with fatty liver may develop cirrhosis. Discussed risk factor management with control of cholesterol/triglycerides, blood glucose, limited EToH, and weight management.       Addendum 9/6/24: records received from [facility]. EGD and colonoscopy 2/2024 were unremarkable. Upper GI with SBFT showed moderate GERD and moderate stool. Limited abd US showed no gallstones and severe fatty liver.        Susannah Painter, APRN-CNP

## 2024-09-03 NOTE — PATIENT INSTRUCTIONS
Thank you for coming to your appointment today   - Please do the stool studies in an outpatient lab   - I will send a record release to [facility]   - Will advise on follow up after results and records received     Please call [phone] with any questions or concerns       If you utilize [employer] messages, please understand that these are intended to be used for simple and straightforward medical questions. If you have a more complex question or numerous complaints, an office appointment may be needed.

## 2024-09-04 ENCOUNTER — LAB (OUTPATIENT)
Dept: LAB | Facility: LAB | Age: 54
End: 2024-09-04
Payer: COMMERCIAL

## 2024-09-04 DIAGNOSIS — K52.9 CHRONIC DIARRHEA: ICD-10-CM

## 2024-09-04 PROCEDURE — 82653 EL-1 FECAL QUANTITATIVE: CPT

## 2024-09-04 PROCEDURE — 83993 ASSAY FOR CALPROTECTIN FECAL: CPT

## 2024-09-07 LAB
CALPROTECTIN STL-MCNT: 98 UG/G
ELASTASE PANC STL-MCNT: 184 UG/G

## 2024-09-09 ENCOUNTER — DOCUMENTATION (OUTPATIENT)
Dept: GASTROENTEROLOGY | Facility: CLINIC | Age: 54
End: 2024-09-09
Payer: COMMERCIAL

## 2024-09-09 DIAGNOSIS — K86.81 EXOCRINE PANCREATIC INSUFFICIENCY (HHS-HCC): Primary | ICD-10-CM

## 2024-09-09 RX ORDER — PANCRELIPASE 36000; 180000; 114000 [USP'U]/1; [USP'U]/1; [USP'U]/1
CAPSULE, DELAYED RELEASE PELLETS ORAL
Qty: 300 CAPSULE | Refills: 1 | Status: SHIPPED | OUTPATIENT
Start: 2024-09-09 | End: 2024-09-09

## 2024-09-09 RX ORDER — PANCRELIPASE 36000; 180000; 114000 [USP'U]/1; [USP'U]/1; [USP'U]/1
CAPSULE, DELAYED RELEASE PELLETS ORAL
Qty: 300 CAPSULE | Refills: 1 | Status: SHIPPED | OUTPATIENT
Start: 2024-09-09

## 2024-09-09 NOTE — PROGRESS NOTES
Please let patient know his stool studies showed a low pancreatic elastase, and borderline elevated fecal calprotectin (can indicate inflammation). I recommend we start by giving pancreatic enzymes to see if this helps his symptoms first, then we can consider repeating the calprotectin in a couple months.     I will send the pancreatic enzymes to the pharmacy. He should take 2 shortly before eating meals, and 1 shortly before eating snacks.     He should follow up in 1-2 months

## 2024-09-09 NOTE — PROGRESS NOTES
Pt called and was notified of results and medication. He needs medication to go to [facility] in [place] due to the medicine place cannot get it.

## 2024-09-26 ENCOUNTER — TELEPHONE (OUTPATIENT)
Dept: GASTROENTEROLOGY | Facility: CLINIC | Age: 54
End: 2024-09-26
Payer: COMMERCIAL

## 2024-09-26 NOTE — TELEPHONE ENCOUNTER
Patient started Creon a couple weeks ago and blood sugar has been elevated since then.  Is there another medication he should try?    He said he has been in touch with Endo and she may start him on Jardiance.

## 2024-10-03 ENCOUNTER — TELEPHONE (OUTPATIENT)
Dept: GASTROENTEROLOGY | Facility: CLINIC | Age: 54
End: 2024-10-03
Payer: COMMERCIAL

## 2024-10-03 NOTE — TELEPHONE ENCOUNTER
Left message on machine to return call  for pt - need to see if he can come in at 1pm instead due to [employer] covering the hospital .

## 2024-10-04 ENCOUNTER — APPOINTMENT (OUTPATIENT)
Dept: GASTROENTEROLOGY | Facility: CLINIC | Age: 54
End: 2024-10-04
Payer: COMMERCIAL

## 2024-10-04 VITALS
HEIGHT: 70 IN | OXYGEN SATURATION: 96 % | SYSTOLIC BLOOD PRESSURE: 129 MMHG | HEART RATE: 87 BPM | BODY MASS INDEX: 28.49 KG/M2 | DIASTOLIC BLOOD PRESSURE: 83 MMHG | WEIGHT: 199 LBS

## 2024-10-04 DIAGNOSIS — K86.81 EXOCRINE PANCREATIC INSUFFICIENCY (HHS-HCC): ICD-10-CM

## 2024-10-04 DIAGNOSIS — R19.5 ELEVATED FECAL CALPROTECTIN: Primary | ICD-10-CM

## 2024-10-04 PROCEDURE — 99214 OFFICE O/P EST MOD 30 MIN: CPT | Performed by: NURSE PRACTITIONER

## 2024-10-04 PROCEDURE — 1036F TOBACCO NON-USER: CPT | Performed by: NURSE PRACTITIONER

## 2024-10-04 PROCEDURE — 3008F BODY MASS INDEX DOCD: CPT | Performed by: NURSE PRACTITIONER

## 2024-10-04 ASSESSMENT — ENCOUNTER SYMPTOMS
FEVER: 0
DIZZINESS: 0
SHORTNESS OF BREATH: 0
BRUISES/BLEEDS EASILY: 0
COUGH: 0
JOINT SWELLING: 0
CHILLS: 0
WOUND: 0
CONFUSION: 0
SORE THROAT: 0
ROS GI COMMENTS: SEE HPI
ARTHRALGIAS: 0
PALPITATIONS: 0
WEAKNESS: 0
TROUBLE SWALLOWING: 0
ADENOPATHY: 0
DIFFICULTY URINATING: 0

## 2024-10-04 NOTE — PATIENT INSTRUCTIONS
Thank you for coming to your appointment today   - Please do the repeat stool test   - Based on that lab, we may need to do a special CT scan that looks at the small intestine called a CTe.   - We will definitely plan on a CT pancreas regardless of that lab   - I will let you know the plan once the lab comes back   - follow up to be advised     Please call [phone] with any questions or concerns       If you utilize [employer] messages, please understand that these are intended to be used for simple and straightforward medical questions. If you have a more complex question or numerous complaints, an office appointment may be needed.

## 2024-10-04 NOTE — PROGRESS NOTES
Subjective   Patient ID: Adrian Vides is a 53 y.o. male with PMH of fatty liver, GERD, HTN, HLD, DM2, cholecystectomy (6/2024) and appendectomy (1983) who was referred by No ref. provider found for Follow-up (Given creon - phone note 9/9/24 -  feels a little better , still having diarrhea after eating , blood sugar running high/Labs/stool testing 9/4/24/EGD/Colon 2/1/24).     Patient's PCP is Celestine Cochran MD     Patient initially seen 9/2024 for complaints of abdominal pain, belching, nausea, and diarrhea. He had previously followed at [facility] and had some work up there: he had an EGD and colonoscopy in January 2024 that showed esophageal reflux changes and colonic diverticulosis but was otherwise normal. Upper GI with SBFT showed moderate GERD and moderate stool. After his last appt with myself, he had a pancreatic elastase that was low at 184, and fecal calprotectin that was borderline elevated at 98. Creon was started for EPI.     Patient presents today for follow up. He has had improvement overall in GI symptoms with Creon. He does still have diarrhea after lunch and after dinner. He admits he often forgets the creon until after he has already started eating these 2 meals. He also has coffee with heavy cream in the mid-morning which he has not been using creon with.     He otherwise denies N/V, abdominal pain, melena, constipation, or hematochezia.   Patient reports abdominal pain, belching, nausea, and diarrhea for many months. He was following at [facility] and reports extensive work up with labs and EGD/colonoscopy in January 2024; he does not believe there was any significant findings. He remembers specifically being told there was no IBD or celiac disease. He cannot remember for sure if he had Barretts or not, but recalls being diagnosed with Barretts several years ago. He is currently taking omeprazole 20mg daily.     He reports symptoms occur intermittently but tend to all occur together. Unclear  of any specific triggering foods. No unintended weight loss, dysphagia, melena, constipation, or hematochezia.     He recently had a cholecystectomy in June 2024, but symptoms predated this and he had no improvement with cholestyramine afterwards.    Summary of endoscopies:  - EGD 2/2024: esophagus with minimal reflux changes, mild active gastritis, normal duodenum.   - Colonoscopy 2/2024: diverticulosis, otherwise normal.       Social Hx:  Tobacco: none  Etoh: 1/week   Recreational drug use: none  NSAIDs: none      Family Hx:  Father -- liver cancer     Review of Systems:  Review of Systems   Constitutional:  Negative for chills and fever.   HENT:  Negative for sore throat and trouble swallowing.    Respiratory:  Negative for cough and shortness of breath.    Cardiovascular:  Negative for chest pain and palpitations.   Gastrointestinal:         SEE HPI   Endocrine: Negative for cold intolerance and heat intolerance.   Genitourinary:  Negative for difficulty urinating.   Musculoskeletal:  Negative for arthralgias and joint swelling.   Skin:  Negative for rash and wound.   Neurological:  Negative for dizziness and weakness.   Hematological:  Negative for adenopathy. Does not bruise/bleed easily.   Psychiatric/Behavioral:  Negative for confusion.         Medications:  Prior to Admission medications    Medication Sig Start Date End Date Taking? Authorizing Provider   amitriptyline (Elavil) 25 mg tablet Take 1 tablet (25 mg) by mouth once daily at bedtime. Patient ONLY TAKES 1/2 PILL   Yes Historical Provider, MD   atorvastatin (Lipitor) 40 mg tablet Take 1 tablet (40 mg) by mouth once daily in the evening.   Yes Historical Provider, MD   fenofibrate (Tricor) 145 mg tablet Take 1 tablet (145 mg) by mouth once daily in the evening.   Yes Historical Provider, MD   icosapent ethyL (Vascepa) 1 gram capsule Take 2 capsules (2 g) by mouth 2 times daily (morning and late afternoon).   Yes Historical Provider, MD   insulin  glargine,hum.rec.anlog (TOUJEO SOLOSTAR U-300 INSULIN SUBQ) Inject 136 Units/day under the skin once daily. Take as directed per insulin instructions.   Yes Historical Provider, MD   lisinopril 5 mg tablet Take 1 tablet (5 mg) by mouth once daily at bedtime.   Yes Historical Provider, MD   metFORMIN (Glucophage) 500 mg tablet Take 1 tablet (500 mg) by mouth 2 times daily (morning and late afternoon).   Yes Historical Provider, MD   omeprazole (PriLOSEC) 20 mg DR capsule Take 1 capsule (20 mg) by mouth once daily in the morning. Take before meals. Do not crush or chew.   Yes Historical Provider, MD   semaglutide (Ozempic) 2 mg/dose (8 mg/3 mL) pen injector Inject 2 mg under the skin every 7 days. Patient takes on Fridays   Yes Historical Provider, MD   HYDROcodone-acetaminophen (Norco) 5-325 mg tablet Take 1 tablet by mouth every 6 hours if needed for severe pain (7 - 10) or moderate pain (4 - 6) (ok to take 2 pills for severe pain). 6/19/24   Fatmata Block MD   cholestyramine (Questran) 4 gram powder Take by mouth 2 times daily (morning and late afternoon).  9/3/24  Historical Provider, MD       Allergies:  Sulfa (sulfonamide antibiotics), Toradol [ketorolac], and Tramadol    Past Medical History:  He has a past medical history of Dyskinesia of gallbladder, Fatty liver, GERD (gastroesophageal reflux disease), Hyperlipidemia, Hypertension, and Type 2 diabetes mellitus.    Past Surgical History:  He has a past surgical history that includes Appendectomy and Urethra surgery (2008).    Social History:  He reports that he has never smoked. He has never used smokeless tobacco. He reports that he does not currently use alcohol. He reports that he does not use drugs.    Objective   Physical exam:  Physical Exam  Constitutional:       General: He is not in acute distress.     Appearance: Normal appearance.   HENT:      Mouth/Throat:      Mouth: Mucous membranes are moist.      Comments: pink  Eyes:      Conjunctiva/sclera:  Conjunctivae normal.      Pupils: Pupils are equal, round, and reactive to light.   Cardiovascular:      Rate and Rhythm: Normal rate and regular rhythm.      Heart sounds: No murmur heard.  Pulmonary:      Effort: Pulmonary effort is normal.      Breath sounds: Normal breath sounds.   Abdominal:      General: Bowel sounds are normal. There is no distension.      Palpations: Abdomen is soft.      Tenderness: There is no abdominal tenderness. There is no guarding.   Skin:     General: Skin is warm and dry.      Coloration: Skin is not jaundiced.   Neurological:      Mental Status: He is alert and oriented to person, place, and time.   Psychiatric:         Mood and Affect: Mood normal.         Behavior: Behavior normal.          Assessment/Plan     EPI, diarrhea   Pancreatic elastase low at 184. Started on Creon and having some improvement with medication, though he is not always taking it prior to eating. He will continue to work on this. Will plan for CT pancreas, however, will have patient repeat a fecal calprotectin first. If it is persistently elevated, will also plan for CTe at the same time as CT pancreas.         Susannah Painter, APRN-CNP

## 2024-10-10 ENCOUNTER — LAB (OUTPATIENT)
Dept: LAB | Facility: LAB | Age: 54
End: 2024-10-10
Payer: COMMERCIAL

## 2024-10-10 DIAGNOSIS — R19.5 ELEVATED FECAL CALPROTECTIN: ICD-10-CM

## 2024-10-10 PROCEDURE — 83993 ASSAY FOR CALPROTECTIN FECAL: CPT

## 2024-10-12 LAB — CALPROTECTIN STL-MCNT: 58 UG/G

## 2024-10-15 DIAGNOSIS — K52.9 CHRONIC DIARRHEA: ICD-10-CM

## 2024-10-15 DIAGNOSIS — R19.5 ELEVATED FECAL CALPROTECTIN: Primary | ICD-10-CM

## 2024-11-14 ENCOUNTER — APPOINTMENT (OUTPATIENT)
Dept: RADIOLOGY | Facility: HOSPITAL | Age: 54
End: 2024-11-14
Payer: COMMERCIAL

## 2024-12-06 ENCOUNTER — HOSPITAL ENCOUNTER (OUTPATIENT)
Dept: RADIOLOGY | Facility: HOSPITAL | Age: 54
Discharge: HOME | End: 2024-12-06
Payer: COMMERCIAL

## 2024-12-06 DIAGNOSIS — K52.9 CHRONIC DIARRHEA: ICD-10-CM

## 2024-12-06 DIAGNOSIS — R19.5 ELEVATED FECAL CALPROTECTIN: ICD-10-CM

## 2024-12-06 PROCEDURE — 2550000001 HC RX 255 CONTRASTS: Performed by: SURGERY

## 2024-12-06 PROCEDURE — 74177 CT ABD & PELVIS W/CONTRAST: CPT

## 2024-12-06 PROCEDURE — 2500000005 HC RX 250 GENERAL PHARMACY W/O HCPCS: Performed by: SURGERY

## 2024-12-27 ENCOUNTER — APPOINTMENT (OUTPATIENT)
Facility: CLINIC | Age: 54
End: 2024-12-27
Payer: COMMERCIAL

## 2024-12-27 VITALS
HEIGHT: 70 IN | WEIGHT: 198.4 LBS | OXYGEN SATURATION: 96 % | HEART RATE: 91 BPM | SYSTOLIC BLOOD PRESSURE: 131 MMHG | DIASTOLIC BLOOD PRESSURE: 79 MMHG | BODY MASS INDEX: 28.4 KG/M2

## 2024-12-27 DIAGNOSIS — K58.0 IRRITABLE BOWEL SYNDROME WITH DIARRHEA: Primary | ICD-10-CM

## 2024-12-27 PROCEDURE — 1036F TOBACCO NON-USER: CPT | Performed by: NURSE PRACTITIONER

## 2024-12-27 PROCEDURE — 3008F BODY MASS INDEX DOCD: CPT | Performed by: NURSE PRACTITIONER

## 2024-12-27 PROCEDURE — 99214 OFFICE O/P EST MOD 30 MIN: CPT | Performed by: NURSE PRACTITIONER

## 2024-12-27 RX ORDER — VALACYCLOVIR HYDROCHLORIDE 1 G/1
TABLET, FILM COATED ORAL
COMMUNITY
Start: 2024-12-19

## 2024-12-27 RX ORDER — PEN NEEDLE, DIABETIC 32GX 5/32"
NEEDLE, DISPOSABLE MISCELLANEOUS
COMMUNITY
Start: 2024-11-07

## 2024-12-27 RX ORDER — BLOOD-GLUCOSE SENSOR
EACH MISCELLANEOUS
COMMUNITY
Start: 2024-12-07

## 2024-12-27 ASSESSMENT — ENCOUNTER SYMPTOMS
DIZZINESS: 0
PALPITATIONS: 0
FEVER: 0
COUGH: 0
ARTHRALGIAS: 0
TROUBLE SWALLOWING: 0
WEAKNESS: 0
ROS GI COMMENTS: SEE HPI
WOUND: 0
SORE THROAT: 0
SHORTNESS OF BREATH: 0
ADENOPATHY: 0
BRUISES/BLEEDS EASILY: 0
JOINT SWELLING: 0
CHILLS: 0
DIFFICULTY URINATING: 0
CONFUSION: 0

## 2024-12-27 NOTE — PROGRESS NOTES
"Subjective   Patient ID: Adrian Vides is a 54 y.o. male with PMH of fatty liver, GERD, HTN, HLD, DM2, cholecystectomy (6/2024) and appendectomy (1983) who presents for Follow-up (Discuss CT scan from 12/6/2024).     Patient's PCP is Celestine Cochran MD     Patient initially seen 9/2024 for complaints of abdominal pain, belching, nausea, and diarrhea. He had previously followed at [facility] and had some work up there: he had an EGD and colonoscopy in January 2024 that showed esophageal reflux changes and colonic diverticulosis but was otherwise normal. Upper GI with SBFT showed moderate GERD and moderate stool. A pancreatic elastase was low at 184, and fecal calprotectin that was borderline elevated at 98. Creon was started for EPI. Repeat fecal calprotectin was still borderline elevated at 58, and CTe showed no small bowel colitis or pancreatic lesions.     Patient presents today for follow up. He reports ongoing diarrhea, though it seems to have improved overall and is only occurring twice a week now. He would like to try stopping Creon as he is unsure this medication is helping. He denies abdominal pain but still has bloating. He reports ongoing \"flares\" of belching, nausea and diarrhea that occur at times. This has been happening for 10 years. He has been on elavil (amitriptyline) 25mg for 10 years for this reason, and is unsure if it is helping as well. He has also tried imodium for 3 months, and has previously tried questran (cholestyramine).       He otherwise denies N/V, abdominal pain, melena, constipation, or hematochezia.       Summary of endoscopies:  - EGD 2/2024: esophagus with minimal reflux changes, mild active gastritis, normal duodenum.   - Colonoscopy 2/2024: diverticulosis, otherwise normal.       Social Hx:  Tobacco: none  Etoh: 1/week   Recreational drug use: none  NSAIDs: none      Family Hx:  Father -- liver cancer     Review of Systems:  Review of Systems   Constitutional:  Negative for " chills and fever.   HENT:  Negative for sore throat and trouble swallowing.    Respiratory:  Negative for cough and shortness of breath.    Cardiovascular:  Negative for chest pain and palpitations.   Gastrointestinal:         SEE HPI   Endocrine: Negative for cold intolerance and heat intolerance.   Genitourinary:  Negative for difficulty urinating.   Musculoskeletal:  Negative for arthralgias and joint swelling.   Skin:  Negative for rash and wound.   Neurological:  Negative for dizziness and weakness.   Hematological:  Negative for adenopathy. Does not bruise/bleed easily.   Psychiatric/Behavioral:  Negative for confusion.         Medications:  Prior to Admission medications    Medication Sig Start Date End Date Taking? Authorizing Provider   amitriptyline (Elavil) 25 mg tablet Take 1 tablet (25 mg) by mouth once daily at bedtime. Patient ONLY TAKES 1/2 PILL   Yes Historical Provider, MD   atorvastatin (Lipitor) 40 mg tablet Take 1 tablet (40 mg) by mouth once daily in the evening.   Yes Historical Provider, MD   fenofibrate (Tricor) 145 mg tablet Take 1 tablet (145 mg) by mouth once daily in the evening.   Yes Historical Provider, MD   icosapent ethyL (Vascepa) 1 gram capsule Take 2 capsules (2 g) by mouth 2 times daily (morning and late afternoon).   Yes Historical Provider, MD   insulin glargine,hum.rec.anlog ([facility] SUBQ) Inject 136 Units/day under the skin once daily. Take as directed per insulin instructions.   Yes Historical Provider, MD   lisinopril 5 mg tablet Take 1 tablet (5 mg) by mouth once daily at bedtime.   Yes Historical Provider, MD   metFORMIN (Glucophage) 500 mg tablet Take 1 tablet (500 mg) by mouth 2 times daily (morning and late afternoon).   Yes Historical Provider, MD   omeprazole (PriLOSEC) 20 mg DR capsule Take 1 capsule (20 mg) by mouth once daily in the morning. Take before meals. Do not crush or chew.   Yes Historical Provider, MD   semaglutide (Ozempic) 2  mg/dose (8 mg/3 mL) pen injector Inject 2 mg under the skin every 7 days. Patient takes on Fridays   Yes Historical Provider, MD   HYDROcodone-acetaminophen (Norco) 5-325 mg tablet Take 1 tablet by mouth every 6 hours if needed for severe pain (7 - 10) or moderate pain (4 - 6) (ok to take 2 pills for severe pain). 6/19/24   Fatmata Block MD   cholestyramine (Questran) 4 gram powder Take by mouth 2 times daily (morning and late afternoon).  9/3/24  Historical Provider, MD       Allergies:  Sulfa (sulfonamide antibiotics), Toradol [ketorolac], and Tramadol    Past Medical History:  He has a past medical history of Chronic diarrhea, Dyskinesia of gallbladder, Fatty liver, GERD (gastroesophageal reflux disease), Hyperlipidemia, Hypertension, and Type 2 diabetes mellitus.    Past Surgical History:  He has a past surgical history that includes Appendectomy; Urethra surgery (2008); and Cholecystectomy (6.19.2024).    Social History:  He reports that he quit smoking about 36 years ago. His smoking use included cigarettes and cigars. He has never used smokeless tobacco. He reports that he does not currently use alcohol after a past usage of about 1.0 standard drink of alcohol per week. He reports that he does not use drugs.    Objective   Physical exam:  Physical Exam  Constitutional:       General: He is not in acute distress.     Appearance: Normal appearance.   HENT:      Mouth/Throat:      Mouth: Mucous membranes are moist.      Comments: pink  Eyes:      Conjunctiva/sclera: Conjunctivae normal.      Pupils: Pupils are equal, round, and reactive to light.   Cardiovascular:      Rate and Rhythm: Normal rate and regular rhythm.      Heart sounds: No murmur heard.  Pulmonary:      Effort: Pulmonary effort is normal.      Breath sounds: Normal breath sounds.   Abdominal:      General: Bowel sounds are normal. There is no distension.      Palpations: Abdomen is soft.      Tenderness: There is abdominal tenderness. There is no  guarding.   Skin:     General: Skin is warm and dry.      Coloration: Skin is not jaundiced.   Neurological:      Mental Status: He is alert and oriented to person, place, and time.   Psychiatric:         Mood and Affect: Mood normal.         Behavior: Behavior normal.          Assessment/Plan     EPI  Pancreatic elastase low at 184. Creon seems to have helped overall though patient is unsure it has. He wants to come off the medication but I advised if symptoms return, he should restart it. Recent imaging showed no concerning pancreatic findings.       IBS-D   Ongoing symptoms of belching, nausea, diarrhea, and abdominal discomfort for over 10 years that occur intermittently. Suspect likely a component of IBS-D. Recommend xifaxan 550mg TID x2 weeks. He currently is taking elavil (amitriptyline) 25mg daily without much improvement, and has been taking imodium PRN since August 1, 2024, though it does cause constipation for him. He has also tried questran without improvement.         Susannah Painter, APRN-CNP

## 2024-12-27 NOTE — PATIENT INSTRUCTIONS
Thank you for coming to your appointment today   - we will try xifaxan. You will take this 3 times per day for 2 weeks, then stop. I will put 2 refills on the medication, dont refill unless we discuss first   - if you stop the creon and symptoms worsen, then restart the creon. Your pancreatic elastase was low, so I suspect that symptoms may worsen if you stop the creon     Please call [phone] with any questions or concerns       If you utilize [facility] messages, please understand that these are intended to be used for simple and straightforward medical questions. If you have a more complex question or numerous complaints, an office appointment may be needed.

## 2025-01-14 ENCOUNTER — HOSPITAL ENCOUNTER (OUTPATIENT)
Dept: GENERAL RADIOLOGY | Age: 55
Discharge: HOME | End: 2025-01-16
Payer: COMMERCIAL

## 2025-01-14 ENCOUNTER — HOSPITAL ENCOUNTER (OUTPATIENT)
Age: 55
Discharge: HOME | End: 2025-01-16
Payer: COMMERCIAL

## 2025-01-14 DIAGNOSIS — R07.9 NONSPECIFIC CHEST PAIN: ICD-10-CM

## 2025-01-14 PROCEDURE — 71046 X-RAY EXAM CHEST 2 VIEWS: CPT

## (undated) DEVICE — GOWN, SURGICAL, UROLOGY, IMPERVIOUS, XLONG, XLARGE, DISPOSABLE

## (undated) DEVICE — POSITIONING, THE PINK PAD, PIGAZZI SYSTEM

## (undated) DEVICE — SUTURE, VICRYL, 0, 27 IN, UR-6, VIOLET

## (undated) DEVICE — SYRINGE, 20 CC, LUER SLIP

## (undated) DEVICE — CARE KIT, LAPAROSCOPIC, ADVANCED

## (undated) DEVICE — SEAL, UNIVERSAL 5-8MM  XI

## (undated) DEVICE — CAUTERY, PENCIL, PUSH BUTTON, SMOKE EVAC, 70MM

## (undated) DEVICE — ADHESIVE, SKIN, LIQUIBAND EXCEED

## (undated) DEVICE — LUBRICANT, ELECTROLUBE, F/ELECTRODE TIPS

## (undated) DEVICE — COVER, PLASTIC, MAYO STAND, 29.5IN X 55.5IN

## (undated) DEVICE — FORCEPS, PROGRASP, DAVINCI XI

## (undated) DEVICE — DRAPE PACK, LAPAROSCOPIC CHOLECYSTECTOMY, CUSTOM, GEAUGA

## (undated) DEVICE — OBTURATOR, BLADELESS , SU

## (undated) DEVICE — CLIP, LIGATING, HEM-O-LOCK, MLX, LARGE, LF, PURPLE

## (undated) DEVICE — SHIELD, PRE-KLENZ, SOAK, MED 6ML

## (undated) DEVICE — ACCESS SYS, KII SHIELDED BLADED, Z-THREAD, 5X100CM

## (undated) DEVICE — APPLICATOR, CHLORAPREP, W/ORANGE TINT, 26ML

## (undated) DEVICE — TUBE SET, PNEUMOCLEAR, SMOKE EVACU, HIGH-FLOW

## (undated) DEVICE — RETRIEVAL SYSTEM, MONARCH, 10MM DISP ENDOSCOPIC

## (undated) DEVICE — CLIP, LIGATING, HEM-O-LOCK, MEDIUM/LARGE, LF, GREEN

## (undated) DEVICE — SOLUTION, INJECTION, USP, SODIUM CHLORIDE 0.9%, .9, NACL, 1000 ML, BAG

## (undated) DEVICE — CLIP, ENDO, CLINCH II, W/RATCHET, ON/OFF, CLINCH II, 5 MM

## (undated) DEVICE — FORCEPS, BIPOLAR FENESTRATED XI

## (undated) DEVICE — COVER, TIP HOT SHEARS ENDOWRIST

## (undated) DEVICE — TROCAR SYSTEM, BALLOON, KII GELPORT, 12 X 100MM

## (undated) DEVICE — DRAPE, ARM XI

## (undated) DEVICE — DRAPE, COLUMN, DAVINCI XI

## (undated) DEVICE — TRAY, MINOR, SINGLE BASIN, STERILE

## (undated) DEVICE — SUTURE, VICRYL, 4-0, 18 IN, PS2, UNDYED

## (undated) DEVICE — ARM, SUCTION IRRIGATOR, DAVINCI XI